# Patient Record
Sex: FEMALE | Race: BLACK OR AFRICAN AMERICAN | NOT HISPANIC OR LATINO | Employment: FULL TIME | URBAN - METROPOLITAN AREA
[De-identification: names, ages, dates, MRNs, and addresses within clinical notes are randomized per-mention and may not be internally consistent; named-entity substitution may affect disease eponyms.]

---

## 2019-04-22 ENCOUNTER — CONVERSION ENCOUNTER (OUTPATIENT)
Dept: FAMILY MEDICINE CLINIC | Facility: CLINIC | Age: 54
End: 2019-04-22

## 2019-04-22 ENCOUNTER — OFFICE VISIT CONVERTED (OUTPATIENT)
Dept: FAMILY MEDICINE CLINIC | Facility: CLINIC | Age: 54
End: 2019-04-22
Attending: FAMILY MEDICINE

## 2019-05-22 ENCOUNTER — HOSPITAL ENCOUNTER (OUTPATIENT)
Dept: FAMILY MEDICINE CLINIC | Facility: CLINIC | Age: 54
Discharge: HOME | End: 2019-05-22
Attending: FAMILY MEDICINE

## 2019-05-22 LAB
25(OH)D3 SERPL-MCNC: 30.5 NG/ML (ref 30–100)
CHOLEST SERPL-MCNC: 178 MG/DL (ref 107–200)
CHOLEST/HDLC SERPL: 4.2 {RATIO} (ref 3–6)
HDLC SERPL-MCNC: 42 MG/DL (ref 40–60)
LDLC SERPL CALC-MCNC: 123 MG/DL (ref 70–100)
T4 FREE SERPL-MCNC: 1.8 NG/DL (ref 0.9–1.8)
TRIGL SERPL-MCNC: 64 MG/DL (ref 40–150)
TSH SERPL-ACNC: 1.82 M[IU]/L (ref 0.27–4.2)
VLDLC SERPL-MCNC: 13 MG/DL (ref 5–37)

## 2019-10-22 ENCOUNTER — OFFICE VISIT CONVERTED (OUTPATIENT)
Dept: FAMILY MEDICINE CLINIC | Facility: CLINIC | Age: 54
End: 2019-10-22
Attending: FAMILY MEDICINE

## 2019-10-22 ENCOUNTER — HOSPITAL ENCOUNTER (OUTPATIENT)
Dept: FAMILY MEDICINE CLINIC | Facility: CLINIC | Age: 54
Discharge: HOME | End: 2019-10-22
Attending: FAMILY MEDICINE

## 2019-10-22 ENCOUNTER — CONVERSION ENCOUNTER (OUTPATIENT)
Dept: FAMILY MEDICINE CLINIC | Facility: CLINIC | Age: 54
End: 2019-10-22

## 2019-10-22 LAB
ALBUMIN SERPL-MCNC: 4.5 G/DL (ref 3.5–5)
ALBUMIN/GLOB SERPL: 1.1 {RATIO} (ref 1.4–2.6)
ALP SERPL-CCNC: 108 U/L (ref 53–141)
ALT SERPL-CCNC: 17 U/L (ref 10–40)
ANION GAP SERPL CALC-SCNC: 18 MMOL/L (ref 8–19)
AST SERPL-CCNC: 18 U/L (ref 15–50)
BILIRUB SERPL-MCNC: 0.75 MG/DL (ref 0.2–1.3)
BUN SERPL-MCNC: 11 MG/DL (ref 5–25)
BUN/CREAT SERPL: 15 {RATIO} (ref 6–20)
CALCIUM SERPL-MCNC: 10.1 MG/DL (ref 8.7–10.4)
CHLORIDE SERPL-SCNC: 99 MMOL/L (ref 99–111)
CHOLEST SERPL-MCNC: 196 MG/DL (ref 107–200)
CHOLEST/HDLC SERPL: 4.2 {RATIO} (ref 3–6)
CONV CO2: 25 MMOL/L (ref 22–32)
CONV TOTAL PROTEIN: 8.5 G/DL (ref 6.3–8.2)
CREAT UR-MCNC: 0.72 MG/DL (ref 0.5–0.9)
GFR SERPLBLD BASED ON 1.73 SQ M-ARVRAT: >60 ML/MIN/{1.73_M2}
GLOBULIN UR ELPH-MCNC: 4 G/DL (ref 2–3.5)
GLUCOSE SERPL-MCNC: 89 MG/DL (ref 65–99)
HDLC SERPL-MCNC: 47 MG/DL (ref 40–60)
LDLC SERPL CALC-MCNC: 135 MG/DL (ref 70–100)
OSMOLALITY SERPL CALC.SUM OF ELEC: 285 MOSM/KG (ref 273–304)
POTASSIUM SERPL-SCNC: 4.3 MMOL/L (ref 3.5–5.3)
SODIUM SERPL-SCNC: 138 MMOL/L (ref 135–147)
T4 FREE SERPL-MCNC: 1.2 NG/DL (ref 0.9–1.8)
TRIGL SERPL-MCNC: 72 MG/DL (ref 40–150)
TSH SERPL-ACNC: 19.9 M[IU]/L (ref 0.27–4.2)
VLDLC SERPL-MCNC: 14 MG/DL (ref 5–37)

## 2019-10-23 LAB — 25(OH)D3 SERPL-MCNC: 22.9 NG/ML (ref 30–100)

## 2019-10-25 ENCOUNTER — HOSPITAL ENCOUNTER (OUTPATIENT)
Dept: FAMILY MEDICINE CLINIC | Facility: CLINIC | Age: 54
Discharge: HOME | End: 2019-10-25
Attending: NURSE PRACTITIONER

## 2019-10-25 ENCOUNTER — CONVERSION ENCOUNTER (OUTPATIENT)
Dept: FAMILY MEDICINE CLINIC | Facility: CLINIC | Age: 54
End: 2019-10-25

## 2019-10-25 ENCOUNTER — OFFICE VISIT CONVERTED (OUTPATIENT)
Dept: FAMILY MEDICINE CLINIC | Facility: CLINIC | Age: 54
End: 2019-10-25
Attending: NURSE PRACTITIONER

## 2019-10-29 LAB
CONV LAST MENSTURAL PERIOD: NORMAL
SPECIMEN SOURCE: NORMAL
SPECIMEN SOURCE: NORMAL
THIN PREP CVX: NORMAL

## 2019-11-25 ENCOUNTER — HOSPITAL ENCOUNTER (OUTPATIENT)
Dept: FAMILY MEDICINE CLINIC | Facility: CLINIC | Age: 54
Discharge: HOME | End: 2019-11-25
Attending: FAMILY MEDICINE

## 2019-11-25 LAB
T4 FREE SERPL-MCNC: 1.6 NG/DL (ref 0.9–1.8)
TSH SERPL-ACNC: 5.18 M[IU]/L (ref 0.27–4.2)

## 2020-01-02 ENCOUNTER — HOSPITAL ENCOUNTER (OUTPATIENT)
Dept: FAMILY MEDICINE CLINIC | Facility: CLINIC | Age: 55
Discharge: HOME | End: 2020-01-02
Attending: FAMILY MEDICINE

## 2020-01-02 ENCOUNTER — OFFICE VISIT CONVERTED (OUTPATIENT)
Dept: FAMILY MEDICINE CLINIC | Facility: CLINIC | Age: 55
End: 2020-01-02
Attending: FAMILY MEDICINE

## 2020-01-02 LAB
FSH SERPL-ACNC: 42.4 M[IU]/ML
LH SERPL-ACNC: 28.8 M[IU]/ML

## 2020-01-29 ENCOUNTER — HOSPITAL ENCOUNTER (OUTPATIENT)
Dept: MAMMOGRAPHY | Facility: HOSPITAL | Age: 55
Discharge: HOME | End: 2020-01-29
Attending: NURSE PRACTITIONER

## 2020-10-05 ENCOUNTER — OFFICE VISIT CONVERTED (OUTPATIENT)
Dept: FAMILY MEDICINE CLINIC | Facility: CLINIC | Age: 55
End: 2020-10-05
Attending: FAMILY MEDICINE

## 2020-10-05 ENCOUNTER — CONVERSION ENCOUNTER (OUTPATIENT)
Dept: FAMILY MEDICINE CLINIC | Facility: CLINIC | Age: 55
End: 2020-10-05

## 2020-10-05 ENCOUNTER — HOSPITAL ENCOUNTER (OUTPATIENT)
Dept: LAB | Facility: HOSPITAL | Age: 55
Discharge: HOME | End: 2020-10-05
Attending: FAMILY MEDICINE

## 2020-10-05 LAB
25(OH)D3 SERPL-MCNC: 30.1 NG/ML (ref 30–100)
ALBUMIN SERPL-MCNC: 4.2 G/DL (ref 3.5–5)
ALBUMIN/GLOB SERPL: 1.2 {RATIO} (ref 1.4–2.6)
ALP SERPL-CCNC: 108 U/L (ref 53–141)
ALT SERPL-CCNC: 15 U/L (ref 10–40)
ANION GAP SERPL CALC-SCNC: 16 MMOL/L (ref 8–19)
AST SERPL-CCNC: 14 U/L (ref 15–50)
BILIRUB SERPL-MCNC: 0.27 MG/DL (ref 0.2–1.3)
BUN SERPL-MCNC: 12 MG/DL (ref 5–25)
BUN/CREAT SERPL: 15 {RATIO} (ref 6–20)
CALCIUM SERPL-MCNC: 9.4 MG/DL (ref 8.7–10.4)
CHLORIDE SERPL-SCNC: 98 MMOL/L (ref 99–111)
CHOLEST SERPL-MCNC: 180 MG/DL (ref 107–200)
CHOLEST/HDLC SERPL: 3.3 {RATIO} (ref 3–6)
CONV CO2: 27 MMOL/L (ref 22–32)
CONV TOTAL PROTEIN: 7.8 G/DL (ref 6.3–8.2)
CREAT UR-MCNC: 0.8 MG/DL (ref 0.5–0.9)
GFR SERPLBLD BASED ON 1.73 SQ M-ARVRAT: >60 ML/MIN/{1.73_M2}
GLOBULIN UR ELPH-MCNC: 3.6 G/DL (ref 2–3.5)
GLUCOSE SERPL-MCNC: 285 MG/DL (ref 65–99)
HDLC SERPL-MCNC: 54 MG/DL (ref 40–60)
LDLC SERPL CALC-MCNC: 101 MG/DL (ref 70–100)
OSMOLALITY SERPL CALC.SUM OF ELEC: 294 MOSM/KG (ref 273–304)
POTASSIUM SERPL-SCNC: 3.9 MMOL/L (ref 3.5–5.3)
SODIUM SERPL-SCNC: 137 MMOL/L (ref 135–147)
T4 FREE SERPL-MCNC: 1.6 NG/DL (ref 0.9–1.8)
TRIGL SERPL-MCNC: 126 MG/DL (ref 40–150)
TSH SERPL-ACNC: 4.56 M[IU]/L (ref 0.27–4.2)
VLDLC SERPL-MCNC: 25 MG/DL (ref 5–37)

## 2020-10-08 ENCOUNTER — HOSPITAL ENCOUNTER (OUTPATIENT)
Dept: LAB | Facility: HOSPITAL | Age: 55
Discharge: HOME | End: 2020-10-08
Attending: FAMILY MEDICINE

## 2020-10-08 ENCOUNTER — OFFICE VISIT CONVERTED (OUTPATIENT)
Dept: FAMILY MEDICINE CLINIC | Facility: CLINIC | Age: 55
End: 2020-10-08
Attending: FAMILY MEDICINE

## 2020-10-08 LAB
EST. AVERAGE GLUCOSE BLD GHB EST-MCNC: 180 MG/DL
HBA1C MFR BLD: 7.9 % (ref 3.5–5.7)

## 2020-10-19 ENCOUNTER — HOSPITAL ENCOUNTER (OUTPATIENT)
Dept: DIABETES SERVICES | Facility: HOSPITAL | Age: 55
Setting detail: RECURRING SERIES
Discharge: STILL ADMITTED OR ADMINISTRATIVE | End: 2020-12-31
Attending: FAMILY MEDICINE

## 2020-11-11 ENCOUNTER — CONVERSION ENCOUNTER (OUTPATIENT)
Dept: FAMILY MEDICINE CLINIC | Facility: CLINIC | Age: 55
End: 2020-11-11

## 2020-11-11 ENCOUNTER — HOSPITAL ENCOUNTER (OUTPATIENT)
Dept: FAMILY MEDICINE CLINIC | Facility: CLINIC | Age: 55
Discharge: HOME | End: 2020-11-11
Attending: NURSE PRACTITIONER

## 2020-11-11 ENCOUNTER — OFFICE VISIT CONVERTED (OUTPATIENT)
Dept: FAMILY MEDICINE CLINIC | Facility: CLINIC | Age: 55
End: 2020-11-11
Attending: NURSE PRACTITIONER

## 2020-11-16 ENCOUNTER — CONVERSION ENCOUNTER (OUTPATIENT)
Dept: FAMILY MEDICINE CLINIC | Facility: CLINIC | Age: 55
End: 2020-11-16

## 2020-11-16 ENCOUNTER — OFFICE VISIT CONVERTED (OUTPATIENT)
Dept: FAMILY MEDICINE CLINIC | Facility: CLINIC | Age: 55
End: 2020-11-16
Attending: FAMILY MEDICINE

## 2021-03-12 ENCOUNTER — HOSPITAL ENCOUNTER (OUTPATIENT)
Dept: MAMMOGRAPHY | Facility: HOSPITAL | Age: 56
Discharge: HOME | End: 2021-03-12
Attending: NURSE PRACTITIONER

## 2021-05-13 NOTE — PROGRESS NOTES
Progress Note      Patient Name: Lynn Stoner   Patient ID: [id number]   Sex: Female   YOB: 1965    Primary Care Provider: Jerry Coto DO    Visit Date: November 16, 2020    Provider: Jerry Coto DO   Location: [facility]   Location Address: [address] [place] [address]  [id number]   Location Phone: [phone]          Chief Complaint  · 1 month follow up - DM2, HLD, HTN  · Discuss medicaiton       History Of Present Illness  Lynn Stoner is a 55 year old /Black female who presents for evaluation and treatment of: DM. She states that she has been checking her bs frequently. Her bs have been very good. SHe has seen the dietician. She started the Metformin, but it caused too much GI distress.       Past Medical History  Disease Name Date Onset Notes   Acquired hypothyroidism --  --    Diabetes mellitus, type 2 10/08/2020 Will add Metformin and get a meter   Essential hypertension 01/02/2020 --    Pap smear for cervical cancer screening 10/25/19 --    Post-menopause 10/22/2019 --    Screening Mammogram 01/29/20 --    Vitamin D deficiency 04/22/2019 --          Past Surgical History  Procedure Name Date Notes   Colonoscopy 12/29/17 Repeat in 5 yrs   Tubal ligation --  25 yrs ago   Uterine ablation --  --          Medication List  Name Date Started Instructions   atorvastatin 10 mg oral tablet 10/08/2020 take 1 tablet (10 mg) by oral route once daily at bedtime for 90 days   Blood Glucose Test miscellaneous strip 10/08/2020 check blood sugar once daily and record (Dx: E11.9)   blood-glucose meter miscellaneous kit 10/08/2020 check blood sugar once daily and record (DX: E11.9)   Flagyl 500 mg oral tablet 11/13/2020 take 1 tablet (500 mg) by oral route 2 times per day for 7 days   Lancets,Ultra Thin miscellaneous misc 10/08/2020 check blood sugar once daily and record (DX: E11.9)   Synthroid 125 mcg oral tablet 10/05/2020 take  "1 tablet by oral route QD, except 1/2 tablet on Sunday   Vitamin D2 1,250 mcg (50,000 unit) oral capsule 10/05/2020 take 1 capsule (50,000 unit) by oral route twice weekly for 90 days         Allergy List  Allergen Name Date Reaction Notes   NO KNOWN DRUG ALLERGIES --  --  --          Family Medical History  Disease Name Relative/Age Notes   - No Family History of Colorectal Cancer  --          Social History  Finding Status Start/Stop Quantity Notes   Alcohol Never --/-- --  --    Tobacco Never --/-- --  --          Review of Systems  · Constitutional  o Denies  o : fatigue  · Eyes  o Denies  o : blurred vision, changes in vision  · HENT  o Denies  o : headaches  · Cardiovascular  o Denies  o : chest pain, irregular heart beats, rapid heart rate  · Respiratory  o Denies  o : shortness of breath, wheezing, cough, dyspnea on exertion  · Gastrointestinal  o Denies  o : nausea, vomiting, diarrhea, constipation, abdominal pain  · Endocrine  o Denies  o : polyuria, polydipsia, central obesity      Vitals  Date Time BP Position Site L\R Cuff Size HR RR TEMP (F) WT  HT  BMI kg/m2 BSA m2 O2 Sat FR L/min FiO2 HC       10/08/2020 08:36 /82 Sitting    82 - R  97.1 187lbs 0oz 5'  4\" 32.1 1.96 100 %  21%    11/11/2020 02:43 /93 Sitting    83 - R  98.2 179lbs 2oz 5'  4\" 30.75 1.92 100 %  21%    11/11/2020 03:29 /104 Sitting                 11/16/2020 12:12 /77 Sitting    89 - R  98.2 174lbs 16oz 5'  4\" 30.04 1.89 99 %  21%    11/16/2020 12:48 /94 Sitting                       Physical Examination  · Constitutional  o Appearance  o : well-nourished, well developed, no obvious deformities present  · Head and Face  o Head  o :   § Inspection  § : atraumatic, normocephalic  o Face  o :   § Inspection  § : no facial lesions  · Respiratory  o Respiratory Effort  o : breathing unlabored, no accessory muscle use  o Auscultation of Lungs  o : normal breath sounds  · Cardiovascular  o Heart  o : "   § Auscultation of Heart  § : regular rate, normal rhythm, no murmurs present          Assessment  · Diabetes mellitus, type 2     250.00/E11.9  Her bs are looking good w/o Metformin  · Essential hypertension     401.9/I10  Her BP is elevated. Will re-check. SHe felt the Irbesarten made dizzy and thus stopped it. ill try alternative       Plan  · Orders  o ACO-39: Current medications updated and reviewed (1159F, ) - - 11/16/2020  · Medications  o amlodipine 5 mg oral tablet   SIG: take 1 tablet (5 mg) by oral route once daily for 90 days   DISP: (90) Tablet with 0 refills  Prescribed on 11/16/2020     o Medications have been Reconciled  o Transition of Care or Provider Policy  · Instructions  o Continue blood sugar monitoring daily and record. Bring your log to office visits. Call the office for readings below 70 and above 250 or any complications.  o Daily foot care. Avoid walking barefoot. Annual Dilated Eye Exam.  o Discussed with patient blood pressure monitoring, hemoglobin A1C levels need to be below 7.0, and LDL (Lipid) goals below 70.  o Patient advised to monitor blood pressure (B/P) at home and journal readings. Patient informed that a B/P reading at home of more than 130/80 is considered hypertension. For readings greater euok702/90 or higher patient is advised to follow up in the office with readings for management. Patient advised to limit sodium intake.  o Patient is taking medications as prescribed and doing well.   o Take all medications as prescribed/directed.  o Patient instructed/educated on their diet and exercise program.  o Patient was educated/instructed on their diagnosis, treatment and medications prior to discharge from the clinic today.  o Patient instructed to seek medical attention urgently for new or worsening symptoms.  o Call the office with any concerns or questions.  o Bring all medicines with their bottles to each office visit.  · Disposition  o Call or Return if symptoms  worsen or persist.  o Return Visit Request in/on 2 months +/- 2 days ([id number]).            Electronically Signed by: Jerry Coto DO -Author on November 16, 2020 12:52:47 PM

## 2021-05-13 NOTE — PROGRESS NOTES
Progress Note      Patient Name: Lynn Stoner   Patient ID: [id number]   Sex: Female   YOB: 1965    Primary Care Provider: Jerry Coto DO    Visit Date: November 11, 2020    Provider: AIDEN Anderson   Location: [facility]   Location Address: [address] [place] [address]  [id number]   Location Phone: [phone]          Chief Complaint  · Annual Exam  · PAP exam  · (Health Maintainence Information Reviewed Under Results)      History Of Present Illness  Last PAP Smear: 10/25/19   Date of Last Mammogram: 01/29/20   Date of Last Colonoscopy: 12/29/17   No current complaints.      Pt is here for a Well Women/Pap Smear    Postmenopausal since 10/2019.    newly dx Dm - pt report diet and exercise modifications.     elevated b/p - pt reports compliance with Irbesartan and d/c hctz d/t nausea.                  Past Medical History  Disease Name Date Onset Notes   Acquired hypothyroidism --  --    Diabetes mellitus, type 2 10/08/2020 Will add Metformin and get a meter   Essential hypertension 01/02/2020 --    Pap smear for cervical cancer screening 10/25/19 --    Post-menopause 10/22/2019 --    Screening Mammogram 01/29/20 --    Vitamin D deficiency 04/22/2019 --          Past Surgical History  Procedure Name Date Notes   Colonoscopy 12/29/17 Repeat in 5 yrs   Tubal ligation --  25 yrs ago   Uterine ablation --  --          Medication List  Name Date Started Instructions   atorvastatin 10 mg oral tablet 10/08/2020 take 1 tablet (10 mg) by oral route once daily at bedtime for 90 days   Blood Glucose Test miscellaneous strip 10/08/2020 check blood sugar once daily and record (Dx: E11.9)   blood-glucose meter miscellaneous kit 10/08/2020 check blood sugar once daily and record (DX: E11.9)   irbesartan 300 mg oral tablet 11/11/2020 take 1 tablet (300 mg) by oral route once daily for 30 days   Lancets,Ultra Thin miscellaneous misc 10/08/2020 check blood  sugar once daily and record (DX: E11.9)   metformin 500 mg oral tablet 10/08/2020 take 1 tablet (500 mg) by oral route 2 times per day with morning and evening meals for 90 days   Synthroid 125 mcg oral tablet 10/05/2020 take 1 tablet by oral route QD, except 1/2 tablet on Sunday   Vitamin D2 1,250 mcg (50,000 unit) oral capsule 10/05/2020 take 1 capsule (50,000 unit) by oral route twice weekly for 90 days         Allergy List  Allergen Name Date Reaction Notes   NO KNOWN DRUG ALLERGIES --  --  --          Family Medical History  Disease Name Relative/Age Notes   - No Family History of Colorectal Cancer  --          Social History  Finding Status Start/Stop Quantity Notes   Alcohol Never --/-- --  --    Tobacco Never --/-- --  --          Review of Systems  · Constitutional  o Denies  o : appetite change, fatigue, night sweats, weight gain, weight loss  · HENT  o Denies  o : hearing loss, tinnitus, vertigo, nasal discharge, nose bleeds, dental pain, mouth lesions, sore throat  · Breasts  o Denies  o : Lumps, tenderness, swelling, Nipple Discharge  · Cardiovascular  o Denies  o : chest Pain, decreased exercise tolerance, exertional dyspnea, palpitations  · Respiratory  o Denies  o : frequent cough, shortness of breath, wheezing, snoring, apneas  · Gastrointestinal  o Denies  o : abdominal pain, nausea, vomiting, dysphagia, reflux/heartburn, changes in bowel habits, constipation, diarrhea  · Genitourinary  o Denies  o : dysuria, hematuria, incontinence, nocturia, urinary frequency, urinary urgency, sexual dysfunction  · Neurologic  o Denies  o : abnormal gait, facial weakness, headache, memory problems, tingling or numbness, seizures, tremor  · Psychiatric  o Denies  o : anxiety, decreased concentration, irritability, panic attacks, sleep distrubances, sadness/tearfulness      Vitals  Date Time BP Position Site L\R Cuff Size HR RR TEMP (F) WT  HT  BMI kg/m2 BSA m2 O2 Sat FR L/min FiO2 HC       10/05/2020 02:37 PM  "149/70 Sitting    94 - R  97.4 188lbs 0oz 5'  4\" 32.27 1.96 98 %      10/08/2020 08:36 /82 Sitting    82 - R  97.1 187lbs 0oz 5'  4\" 32.1 1.96 100 %  21%    11/11/2020 02:43 /93 Sitting    83 - R  98.2 179lbs 2oz 5'  4\" 30.75 1.92 100 %  21%    11/11/2020 03:29 /104 Sitting                       Physical Examination  · Constitutional  o Appearance  o : well-nourished, in no acute distress  · Neck  o Inspection/Palpation  o : normal appearance, no masses or tenderness, trachea midline  o Range of Motion  o : cervical range of motion within normal limits  o Thyroid  o : gland size normal, nontender, no nodules or masses present on palpation  · Respiratory  o Respiratory Effort  o : breathing unlabored  o Inspection of Chest  o : normal appearance  o Auscultation of Lungs  o : normal breath sounds throughout  · Cardiovascular  o Heart  o :   § Auscultation of Heart  § : regular rate and rhythm, no murmurs, gallops or rubs  o Peripheral Vascular System  o :   § Carotid Arteries  § : normal pulses bilaterally, no bruits present  § Pedal Pulses  § : pulses 2 bilaterally  § Extremities  § : no edema  · Breasts  o Inspection of Breasts  o : breasts symmetrical, no skin changes, no deformities present, no discharge present  o Palpation of Breasts, Axillae  o : no masses present on palpation, no breast tenderness  · Gastrointestinal  o Abdominal Examination  o : abdomen nontender to palpation, tone normal without rigidity or guarding, no masses present, normal bowel sounds  · Genitourinary  o External Genitalia  o : no inflammation, no lesions present  o Vagina  o : normal vaginal vault, no discharge present, no inflammatory lesions present, no masses present  o Urethra  o :   § Urethral Meatus  § : no inflammation present  o Bladder  o : nontender to palpation  o Cervix  o : appearance healthy, no lesions present, nontender to palpation, no discharges, no bleeding present, normal midline " position  o Uterus  o : nontender to palpation, no masses present, position midline/midplane  o Adnexa  o : no tenderness or masses present on bimanual examination  o Anus  o : no inflammation or lesions present  o Perineum  o : perineum within normal limits  · Lymphatic  o Neck  o : no lymphadenopathy present  o Axilla  o : no lymphadenopathy present  o Groin  o : no lymphadenopathy present  · Neurologic  o Mental Status Examination  o :   § Orientation  § : grossly oriented to person, place and time  o Gait and Station  o : normal gait, able to stand without difficulty  · Psychiatric  o Judgement and Insight  o : judgment and insight intact  o Mood and Affect  o : mood normal, affect appropriate          Assessment  · Pap Smear     V76.2/Z01.419  · Routine gynecological examination     V72.31/Z01.419  · Screening Mammogram     V76.10/Z12.39      Plan  · Orders  o Pap smear (07541) - V76.2/Z01.419 - 11/11/2020  o Vaginal Screen (Candida, Gardnerella & Trichomonas) (65852) - V72.31/Z01.419 - 11/11/2020  o Screening Mammogram 3D Bilateral (63907, , 38135) - V76.10/Z12.39 - 02/01/2021  · Medications  o irbesartan 300 mg oral tablet   SIG: take 1 tablet (300 mg) by oral route once daily for 30 days   DISP: (30) Tablet with 1 refills  Adjusted on 11/11/2020     o hydrochlorothiazide 12.5 mg oral tablet   SIG: take 1 tablet (12.5 mg) by oral route once daily for 90 days   DISP: (90) Tablet with 0 refills  Discontinued on 11/11/2020     · Instructions  o **Pap Test/Liquid Based:   o Thin Prep  o Source:   o Cervix  o ********  o **Perform Reflex Human Papilloma Virus (HPV) High Risk on this Pap (If atypical squamous cells of the undetermined signifigcance (ASCUS)/Atypical Glandular Cells of undetermined significance (AGCUS): Low Grade Squamous Intraepitheal lesion (LGSIL): **  o ********  o Medicare:  o No  o **Is this an annual PAP:  o Yes  o Counseled on monthly breast self exams.   o Counseled on diet and  exercise.   o Counseled on weight-bearing exercise.  o Recommended Calcium with Vitamin D twice daily.  o Used cytobrush to obtain Pap smear specimen. Sent to pathology for testing and review.  o handouts: dm carb counting, heart healthy diet.   o Electronically Identified Patient Education Materials Provided Electronically  · Disposition  o Follow up pending results.            Electronically Signed by: AIDEN Anderson -Author on November 11, 2020 03:30:11 PM

## 2021-05-13 NOTE — PROGRESS NOTES
Progress Note      Patient Name: Lynn Stoner   Patient ID: [id number]   Sex: Female   YOB: 1965    Primary Care Provider: Jerry Coto DO    Visit Date: October 5, 2020    Provider: Jerry Coto DO   Location: [facility]   Location Address: [address] [place] [address]  [id number]   Location Phone: [phone]          Chief Complaint  · follow up- hot flashes/ HTN      History Of Present Illness  Lynn Stoner is a 55 year old /Black female who presents for evaluation and treatment of: Hypothyroid, Vit D def., and HTN. She is taken her medication daily as prescribed. She checks her BP and it has been good.       Past Medical History  Disease Name Date Onset Notes   Acquired hypothyroidism --  --    Essential hypertension 01/02/2020 --    Pap smear for cervical cancer screening 4/2018 --    Post-menopause 10/22/2019 --    Screening Mammogram 1/2019 --    Vitamin D deficiency 04/22/2019 --          Past Surgical History  Procedure Name Date Notes   Colonoscopy 2018 --    Tubal ligation --  25 yrs ago   Uterine ablation --  --          Medication List  Name Date Started Instructions   Synthroid 125 mcg oral tablet 10/05/2020 take 1 tablet by oral route QD, except 1/2 tablet on Sunday   Vitamin D2 1,250 mcg (50,000 unit) oral capsule 10/05/2020 take 1 capsule (50,000 unit) by oral route twice weekly for 90 days         Allergy List  Allergen Name Date Reaction Notes   NO KNOWN DRUG ALLERGIES --  --  --          Family Medical History  Disease Name Relative/Age Notes   - No Family History of Colorectal Cancer  --          Social History  Finding Status Start/Stop Quantity Notes   Alcohol Never --/-- --  --    Tobacco Never --/-- --  --          Review of Systems  · Constitutional  o Denies  o : fatigue  · Eyes  o Denies  o : changes in vision  · HENT  o Denies  o : headaches  · Cardiovascular  o Admits  o : rapid heart  "rate  o Denies  o : chest pain, irregular heart beats  · Respiratory  o Denies  o : shortness of breath, wheezing, cough, dyspnea on exertion  · Gastrointestinal  o Denies  o : diarrhea, constipation      Vitals  Date Time BP Position Site L\R Cuff Size HR RR TEMP (F) WT  HT  BMI kg/m2 BSA m2 O2 Sat FR L/min FiO2 HC       10/25/2019 11:40 /85 Sitting    68 - R 18 97.1 188lbs 8oz 5'  4\" 32.36 1.96 100 %      01/02/2020 01:58 /88 Sitting    82 - R   187lbs 0oz 5'  4\" 32.1 1.96 98 %  21%    10/05/2020 02:37 /70 Sitting    94 - R  97.4 188lbs 0oz 5'  4\" 32.27 1.96 98 %      10/05/2020 03:20 /70 Sitting                       Physical Examination  · Constitutional  o Appearance  o : well-nourished, well developed, alert, in no acute distress, well-tended appearance  · Head and Face  o Head  o :   § Inspection  § : atraumatic, normocephalic  o Face  o :   § Inspection  § : no facial lesions  o HEENT  o : Unremarkable  · Eyes  o Conjunctivae  o : conjunctivae normal  o Sclerae  o : sclerae white  o Pupils and Irises  o : pupils equal and round, pupils reactive to light bilaterally  o Eyelids/Ocular Adnexae  o : eyelid appearance normal  · Ears, Nose, Mouth and Throat  o Ears  o :   § External Ears  § : appearance within normal limits, no lesions present  § Otoscopic Examination  § : tympanic membrane appearance within normal limits bilaterally without perforations, mobility normal  o Nose  o :   § External Nose  § : appearance normal  o Oral Cavity  o :   § Oral Mucosa  § : oral mucosa normal  § Lips  § : lip appearance normal  § Teeth  § : normal dentition for age  § Gums  § : gums pink, non-swollen, no bleeding present  § Tongue  § : tongue appearance normal  § Palate  § : hard palate normal, soft palate appearance normal  o Throat  o :   § Oropharynx  § : no inflammation or lesions present, tonsils within normal limits  · Neck  o Inspection/Palpation  o : normal appearance, no masses or " tenderness, trachea midline  o Thyroid  o : gland size normal, nontender, no nodules or masses present on palpation  · Respiratory  o Respiratory Effort  o : breathing unlabored  o Auscultation of Lungs  o : normal breath sounds  · Cardiovascular  o Heart  o :   § Auscultation of Heart  § : regular rate, normal rhythm, no murmurs present  o Peripheral Vascular System  o :   § Extremities  § : no edema  · Lymphatic  o Neck  o : no lymphadenopathy           Assessment  · Screening for depression     V79.0/Z13.89  · Essential hypertension     401.9/I10  Her BP needs to be lower. Will add HCTZ  · Vitamin D deficiency     268.9/E55.9  will update   · Acquired hypothyroidism     244.9/E03.9  will update       Plan  · Orders  o Annual depression screening, 15 minutes (26673, ) - V79.0/Z13.89 - 10/05/2020  o CMP [facility] (44750) - 401.9/I10 - 10/05/2020  o Lipid Panel [facility] (95518) - 401.9/I10 - 10/05/2020  o Thyroid Profile (33539, 10139, THYII) - 244.9/E03.9 - 10/05/2020  o Vitamin D (25-Hydroxy) Level (92236) - 268.9/E55.9 - 10/05/2020  o ACO-39: Current medications updated and reviewed (1159F, ) - - 10/05/2020  o ACO-18: Negative screen for clinical depression using a standardized tool () - - 10/05/2020  o ACO-14: Influenza immunization was not administered for reasons documented [facility] () - - 10/05/2020   PT DECLINED  · Medications  o hydrochlorothiazide 12.5 mg oral tablet   SIG: take 1 tablet (12.5 mg) by oral route once daily for 90 days   DISP: (90) Tablet with 0 refills  Prescribed on 10/05/2020     o Synthroid 125 mcg oral tablet   SIG: take 1 tablet by oral route QD, except 1/2 tablet on Sunday   DISP: (90) Tablet with 3 refills  Adjusted on 10/05/2020     o Vitamin D2 1,250 mcg (50,000 unit) oral capsule   SIG: take 1 capsule (50,000 unit) by oral route twice weekly for 90 days   DISP: (26) Capsule with 3 refills  Adjusted on 10/05/2020     o Medications have been Reconciled  o Transition of Care or  Provider Policy  · Instructions  o Depression Screen completed and scanned into the EMR under the designated folder within the patient's documents.  o The provider screening met the required time of 15 minutes.  o Patient is taking medications as prescribed and doing well.   o Take all medications as prescribed/directed.  o Patient instructed/educated on their diet and exercise program.  o Patient was educated/instructed on their diagnosis, treatment and medications prior to discharge from the clinic today.  o Patient instructed to seek medical attention urgently for new or worsening symptoms.  o Call the office with any concerns or questions.  o Bring all medicines with their bottles to each office visit.  · Disposition  o Call or Return if symptoms worsen or persist.  o Return Visit Request in/on 2 months +/- 2 days ([id number]).            Electronically Signed by: Delisa Palacio MA -Author on October 5, 2020 03:42:58 PM  Electronically Co-signed by: Jerry Coto DO -Reviewer on October 5, 2020 04:47:31 PM

## 2021-05-13 NOTE — PROGRESS NOTES
Progress Note      Patient Name: Lynn Stoner   Patient ID: [id number]   Sex: Female   YOB: 1965    Primary Care Provider: Jerry Coto DO    Visit Date: October 8, 2020    Provider: Jerry Coto DO   Location: [facility]   Location Address: [address] [place] [address]  [id number]   Location Phone: [phone]          Chief Complaint  · follow up- Hgb A1c 10/5/20      History Of Present Illness  Lynn Stoner is a 55 year old /Black female who presents for evaluation and treatment of: elevated bs. She does exercise, but more cross fit.       Past Medical History  Disease Name Date Onset Notes   Acquired hypothyroidism --  --    Essential hypertension 01/02/2020 --    Pap smear for cervical cancer screening 10/25/19 --    Post-menopause 10/22/2019 --    Screening Mammogram 01/29/20 --    Vitamin D deficiency 04/22/2019 --          Past Surgical History  Procedure Name Date Notes   Colonoscopy 12/29/17 --    Tubal ligation --  25 yrs ago   Uterine ablation --  --          Medication List  Name Date Started Instructions   hydrochlorothiazide 12.5 mg oral tablet 10/05/2020 take 1 tablet (12.5 mg) by oral route once daily for 90 days   Synthroid 125 mcg oral tablet 10/05/2020 take 1 tablet by oral route QD, except 1/2 tablet on Sunday   Vitamin D2 1,250 mcg (50,000 unit) oral capsule 10/05/2020 take 1 capsule (50,000 unit) by oral route twice weekly for 90 days         Allergy List  Allergen Name Date Reaction Notes   NO KNOWN DRUG ALLERGIES --  --  --        Allergies Reconciled  Family Medical History  Disease Name Relative/Age Notes   - No Family History of Colorectal Cancer  --          Social History  Finding Status Start/Stop Quantity Notes   Alcohol Never --/-- --  --    Tobacco Never --/-- --  --          Review of Systems  · Constitutional  o Denies  o : fatigue  · Eyes  o Denies  o : blurred  "vision  · HENT  o Admits  o : headaches  · Endocrine  o Admits  o : central obesity  o Denies  o : polyuria, polydipsia      Vitals  Date Time BP Position Site L\R Cuff Size HR RR TEMP (F) WT  HT  BMI kg/m2 BSA m2 O2 Sat FR L/min FiO2 HC       10/08/2020 08:36 /82 Sitting    82 - R  97.1 187lbs 0oz 5'  4\" 32.1 1.96 100 %  21%          Physical Examination  · Constitutional  o Appearance  o : well-nourished, well developed, alert, in no acute distress, well-tended appearance  · Head and Face  o Head  o :   § Inspection  § : atraumatic, normocephalic  o Face  o :   § Inspection  § : no facial lesions  o HEENT  o : Unremarkable  · Eyes  o Conjunctivae  o : conjunctivae normal  o Sclerae  o : sclerae white  o Pupils and Irises  o : pupils equal and round, pupils reactive to light bilaterally  o Eyelids/Ocular Adnexae  o : eyelid appearance normal  · Ears, Nose, Mouth and Throat  o Ears  o :   § External Ears  § : appearance within normal limits, no lesions present  § Otoscopic Examination  § : tympanic membrane appearance within normal limits bilaterally without perforations, mobility normal  o Nose  o :   § External Nose  § : appearance normal  o Oral Cavity  o :   § Oral Mucosa  § : oral mucosa normal  § Lips  § : lip appearance normal  § Teeth  § : normal dentition for age  § Gums  § : gums pink, non-swollen, no bleeding present  § Tongue  § : tongue appearance normal  § Palate  § : hard palate normal, soft palate appearance normal  o Throat  o :   § Oropharynx  § : no inflammation or lesions present, tonsils within normal limits  · Neck  o Inspection/Palpation  o : normal appearance, no masses or tenderness, trachea midline  o Thyroid  o : gland size normal, nontender, no nodules or masses present on palpation  · Respiratory  o Respiratory Effort  o : breathing unlabored  o Auscultation of Lungs  o : normal breath sounds  · Cardiovascular  o Heart  o :   § Auscultation of Heart  § : regular rate, normal " rhythm, no murmurs present  · Lymphatic  o Neck  o : no lymphadenopathy           Assessment  · Diabetes mellitus, type 2     250.00/E11.9  Will add Metformin and get a meter  · Essential hypertension     401.9/I10  will start an ARB due to the Dx of DM  · Hyperlipidemia     272.4/E78.5  Will add statin      Plan  · Orders  o ACO-39: Current medications updated and reviewed (, 1159F) - - 10/08/2020  o ACO-14: Influenza immunization was not administered for reasons documented [facility] () - - 10/08/2020   Patient declined  o Diabetes Medical Nutrition Therapy (DMNT) [facility] (DMMNT) - - 10/08/2020  · Medications  o metformin 500 mg oral tablet   SIG: take 1 tablet (500 mg) by oral route 2 times per day with morning and evening meals for 90 days   DISP: (180) Tablet with 0 refills  Prescribed on 10/08/2020     o atorvastatin 10 mg oral tablet   SIG: take 1 tablet (10 mg) by oral route once daily at bedtime for 90 days   DISP: (90) Tablet with 0 refills  Prescribed on 10/08/2020     o irbesartan 150 mg oral tablet   SIG: take 1 tablet (150 mg) by oral route once daily for 90 days   DISP: (90) Tablet with 0 refills  Prescribed on 10/08/2020     o blood-glucose meter miscellaneous kit   SIG: check blood sugar once daily and record (DX: E11.9)   DISP: (1) Kit with 0 refills  Prescribed on 10/08/2020     o Lancets,Ultra Thin miscellaneous misc   SIG: check blood sugar once daily and record (DX: E11.9)   DISP: (1) Lancet with 3 refills  Prescribed on 10/08/2020     o Blood Glucose Test miscellaneous strip   SIG: check blood sugar once daily and record (Dx: E11.9)   DISP: (1) Strip with 0 refills  Prescribed on 10/08/2020     o Medications have been Reconciled  o Transition of Care or Provider Policy  · Instructions  o Continue blood sugar monitoring daily and record. Bring your log to office visits. Call the office for readings below 70 and above 250 or any complications.  o Daily foot care. Avoid walking  barefoot. Annual Dilated Eye Exam.  o Discussed with patient blood pressure monitoring, hemoglobin A1C levels need to be below 7.0, and LDL (Lipid) goals below 70.  o Patient advised to monitor blood pressure (B/P) at home and journal readings. Patient informed that a B/P reading at home of more than 130/80 is considered hypertension. For readings greater [id number]/90 or higher patient is advised to follow up in the office with readings for management. Patient advised to limit sodium intake.  o Patient was educated and given low cholesterol diet information.  o Recommended exercise program to assist with cholesterol, weight loss and overall health improvement.  o Advised that cheeses and other sources of dairy fats, animal fats, fast food, and the extras (candy, pastries, pies, doughnuts and cookies) all contain LDL raising nutrients. Advised to increase fruits, vegetables, whole grains, and to monitor portion sizes.   o Patient is taking medications as prescribed and doing well.   o Take all medications as prescribed/directed.  o Patient instructed/educated on their diet and exercise program.  o Patient was educated/instructed on their diagnosis, treatment and medications prior to discharge from the clinic today.  o Patient instructed to seek medical attention urgently for new or worsening symptoms.  o Call the office with any concerns or questions.  o Bring all medicines with their bottles to each office visit.  · Disposition  o Call or Return if symptoms worsen or persist.  o Return Visit Request in/on 4 weeks +/- 2 days ([id number]).            Electronically Signed by: Jerry Coto, DO -Author on October 8, 2020 09:18:50 AM

## 2021-05-14 VITALS
SYSTOLIC BLOOD PRESSURE: 164 MMHG | HEART RATE: 83 BPM | OXYGEN SATURATION: 100 % | BODY MASS INDEX: 30.58 KG/M2 | WEIGHT: 179.12 LBS | TEMPERATURE: 98.2 F | DIASTOLIC BLOOD PRESSURE: 93 MMHG | HEIGHT: 64 IN | DIASTOLIC BLOOD PRESSURE: 104 MMHG | SYSTOLIC BLOOD PRESSURE: 164 MMHG

## 2021-05-14 VITALS
HEART RATE: 82 BPM | BODY MASS INDEX: 31.92 KG/M2 | WEIGHT: 187 LBS | DIASTOLIC BLOOD PRESSURE: 82 MMHG | HEIGHT: 64 IN | SYSTOLIC BLOOD PRESSURE: 155 MMHG | TEMPERATURE: 97.1 F | OXYGEN SATURATION: 100 %

## 2021-05-14 VITALS
HEART RATE: 89 BPM | BODY MASS INDEX: 29.88 KG/M2 | DIASTOLIC BLOOD PRESSURE: 77 MMHG | SYSTOLIC BLOOD PRESSURE: 163 MMHG | TEMPERATURE: 98.2 F | HEIGHT: 64 IN | OXYGEN SATURATION: 99 % | WEIGHT: 175 LBS

## 2021-05-14 VITALS
HEART RATE: 94 BPM | DIASTOLIC BLOOD PRESSURE: 70 MMHG | WEIGHT: 188 LBS | BODY MASS INDEX: 32.1 KG/M2 | SYSTOLIC BLOOD PRESSURE: 140 MMHG | TEMPERATURE: 97.4 F | SYSTOLIC BLOOD PRESSURE: 149 MMHG | DIASTOLIC BLOOD PRESSURE: 70 MMHG | HEIGHT: 64 IN | OXYGEN SATURATION: 98 %

## 2021-05-15 VITALS
TEMPERATURE: 97.1 F | HEIGHT: 64 IN | OXYGEN SATURATION: 100 % | BODY MASS INDEX: 32.18 KG/M2 | HEART RATE: 68 BPM | DIASTOLIC BLOOD PRESSURE: 85 MMHG | WEIGHT: 188.5 LBS | SYSTOLIC BLOOD PRESSURE: 148 MMHG | RESPIRATION RATE: 18 BRPM

## 2021-05-15 VITALS
HEIGHT: 64 IN | BODY MASS INDEX: 32.84 KG/M2 | DIASTOLIC BLOOD PRESSURE: 93 MMHG | DIASTOLIC BLOOD PRESSURE: 82 MMHG | OXYGEN SATURATION: 100 % | HEART RATE: 71 BPM | SYSTOLIC BLOOD PRESSURE: 168 MMHG | TEMPERATURE: 97.9 F | SYSTOLIC BLOOD PRESSURE: 173 MMHG | WEIGHT: 192.37 LBS

## 2021-05-15 VITALS
TEMPERATURE: 97.7 F | WEIGHT: 185.5 LBS | HEART RATE: 80 BPM | HEIGHT: 64 IN | DIASTOLIC BLOOD PRESSURE: 72 MMHG | SYSTOLIC BLOOD PRESSURE: 142 MMHG | BODY MASS INDEX: 31.67 KG/M2 | OXYGEN SATURATION: 99 %

## 2021-05-15 VITALS
SYSTOLIC BLOOD PRESSURE: 151 MMHG | HEART RATE: 82 BPM | DIASTOLIC BLOOD PRESSURE: 88 MMHG | HEIGHT: 64 IN | OXYGEN SATURATION: 98 % | WEIGHT: 187 LBS | BODY MASS INDEX: 31.92 KG/M2

## 2021-11-01 ENCOUNTER — OFFICE VISIT (OUTPATIENT)
Dept: FAMILY MEDICINE CLINIC | Facility: CLINIC | Age: 56
End: 2021-11-01

## 2021-11-01 VITALS
TEMPERATURE: 97.4 F | HEART RATE: 106 BPM | SYSTOLIC BLOOD PRESSURE: 172 MMHG | HEIGHT: 64 IN | OXYGEN SATURATION: 100 % | WEIGHT: 179.8 LBS | RESPIRATION RATE: 17 BRPM | DIASTOLIC BLOOD PRESSURE: 98 MMHG | BODY MASS INDEX: 30.7 KG/M2

## 2021-11-01 DIAGNOSIS — I10 ESSENTIAL HYPERTENSION: Primary | ICD-10-CM

## 2021-11-01 DIAGNOSIS — E11.9 TYPE 2 DIABETES MELLITUS WITHOUT COMPLICATION, WITHOUT LONG-TERM CURRENT USE OF INSULIN (HCC): ICD-10-CM

## 2021-11-01 DIAGNOSIS — E03.9 ACQUIRED HYPOTHYROIDISM: ICD-10-CM

## 2021-11-01 PROBLEM — E55.9 VITAMIN D DEFICIENCY: Status: ACTIVE | Noted: 2019-04-22

## 2021-11-01 PROBLEM — Z12.4 PAP SMEAR FOR CERVICAL CANCER SCREENING: Status: ACTIVE | Noted: 2019-10-25

## 2021-11-01 PROBLEM — Z78.0 POST-MENOPAUSE: Status: ACTIVE | Noted: 2019-10-22

## 2021-11-01 LAB
CHOLEST SERPL-MCNC: 222 MG/DL (ref 0–200)
HBA1C MFR BLD: 12.09 % (ref 4.8–5.6)
HDLC SERPL-MCNC: 41 MG/DL (ref 40–60)
LDLC SERPL CALC-MCNC: 160 MG/DL (ref 0–100)
LDLC/HDLC SERPL: 3.84 {RATIO}
T4 FREE SERPL-MCNC: 1.87 NG/DL (ref 0.93–1.7)
TRIGL SERPL-MCNC: 117 MG/DL (ref 0–150)
TSH SERPL DL<=0.05 MIU/L-ACNC: 2.98 UIU/ML (ref 0.27–4.2)
VLDLC SERPL-MCNC: 21 MG/DL (ref 5–40)

## 2021-11-01 PROCEDURE — 84439 ASSAY OF FREE THYROXINE: CPT | Performed by: FAMILY MEDICINE

## 2021-11-01 PROCEDURE — 99214 OFFICE O/P EST MOD 30 MIN: CPT | Performed by: FAMILY MEDICINE

## 2021-11-01 PROCEDURE — 80053 COMPREHEN METABOLIC PANEL: CPT | Performed by: FAMILY MEDICINE

## 2021-11-01 PROCEDURE — 36415 COLL VENOUS BLD VENIPUNCTURE: CPT | Performed by: FAMILY MEDICINE

## 2021-11-01 PROCEDURE — 84443 ASSAY THYROID STIM HORMONE: CPT | Performed by: FAMILY MEDICINE

## 2021-11-01 PROCEDURE — 80061 LIPID PANEL: CPT | Performed by: FAMILY MEDICINE

## 2021-11-01 PROCEDURE — 83036 HEMOGLOBIN GLYCOSYLATED A1C: CPT | Performed by: FAMILY MEDICINE

## 2021-11-01 RX ORDER — BLOOD SUGAR DIAGNOSTIC
STRIP MISCELLANEOUS
Qty: 100 EACH | Refills: 3 | Status: SHIPPED | OUTPATIENT
Start: 2021-11-01 | End: 2022-11-01 | Stop reason: SDUPTHER

## 2021-11-01 RX ORDER — LISINOPRIL 20 MG/1
20 TABLET ORAL DAILY
Qty: 90 TABLET | Refills: 0 | Status: SHIPPED | OUTPATIENT
Start: 2021-11-01 | End: 2022-05-05 | Stop reason: SDUPTHER

## 2021-11-01 RX ORDER — DEXAMETHASONE 4 MG/1
TABLET ORAL
COMMUNITY
Start: 2021-09-28 | End: 2021-11-01

## 2021-11-01 RX ORDER — LANCETS 33 GAUGE
33 EACH MISCELLANEOUS DAILY
Qty: 100 EACH | Refills: 3 | Status: SHIPPED | OUTPATIENT
Start: 2021-11-01 | End: 2022-02-09

## 2021-11-01 NOTE — ASSESSMENT & PLAN NOTE
It has been a while since she has had her labs done we will go ahead and update her diabetic labs here today.

## 2021-11-01 NOTE — ASSESSMENT & PLAN NOTE
Her blood pressure is modestly elevated here today.  We will go ahead and start her on medication.

## 2021-11-01 NOTE — ASSESSMENT & PLAN NOTE
Her symptoms are concerning for her thyroid being off.  We will look go ahead and update her thyroid profile

## 2021-11-02 LAB
ALBUMIN SERPL-MCNC: 4.7 G/DL (ref 3.5–5.2)
ALBUMIN/GLOB SERPL: 1.2 G/DL
ALP SERPL-CCNC: 134 U/L (ref 39–117)
ALT SERPL W P-5'-P-CCNC: 20 U/L (ref 1–33)
ANION GAP SERPL CALCULATED.3IONS-SCNC: 16 MMOL/L (ref 5–15)
AST SERPL-CCNC: 13 U/L (ref 1–32)
BILIRUB SERPL-MCNC: 0.4 MG/DL (ref 0–1.2)
BUN SERPL-MCNC: 11 MG/DL (ref 6–20)
BUN/CREAT SERPL: 14.1 (ref 7–25)
CALCIUM SPEC-SCNC: 10.2 MG/DL (ref 8.6–10.5)
CHLORIDE SERPL-SCNC: 93 MMOL/L (ref 98–107)
CO2 SERPL-SCNC: 24 MMOL/L (ref 22–29)
CREAT SERPL-MCNC: 0.78 MG/DL (ref 0.57–1)
GFR SERPL CREATININE-BSD FRML MDRD: 93 ML/MIN/1.73
GLOBULIN UR ELPH-MCNC: 3.8 GM/DL
GLUCOSE SERPL-MCNC: 486 MG/DL (ref 65–99)
POTASSIUM SERPL-SCNC: 4.3 MMOL/L (ref 3.5–5.2)
PROT SERPL-MCNC: 8.5 G/DL (ref 6–8.5)
SODIUM SERPL-SCNC: 133 MMOL/L (ref 136–145)

## 2021-11-02 RX ORDER — ATORVASTATIN CALCIUM 40 MG/1
40 TABLET, FILM COATED ORAL NIGHTLY
Qty: 90 TABLET | Refills: 0 | Status: SHIPPED | OUTPATIENT
Start: 2021-11-02 | End: 2022-01-31

## 2021-11-02 RX ORDER — METFORMIN HYDROCHLORIDE 500 MG/1
500 TABLET, EXTENDED RELEASE ORAL 2 TIMES DAILY WITH MEALS
Qty: 180 TABLET | Refills: 0 | Status: SHIPPED | OUTPATIENT
Start: 2021-11-02 | End: 2022-11-03 | Stop reason: SDUPTHER

## 2021-11-15 ENCOUNTER — OFFICE VISIT (OUTPATIENT)
Dept: FAMILY MEDICINE CLINIC | Facility: CLINIC | Age: 56
End: 2021-11-15

## 2021-11-15 VITALS
WEIGHT: 178 LBS | DIASTOLIC BLOOD PRESSURE: 101 MMHG | BODY MASS INDEX: 30.39 KG/M2 | OXYGEN SATURATION: 96 % | SYSTOLIC BLOOD PRESSURE: 178 MMHG | HEART RATE: 105 BPM | HEIGHT: 64 IN | TEMPERATURE: 98.2 F

## 2021-11-15 DIAGNOSIS — Z12.31 VISIT FOR SCREENING MAMMOGRAM: ICD-10-CM

## 2021-11-15 DIAGNOSIS — I10 ESSENTIAL HYPERTENSION: ICD-10-CM

## 2021-11-15 DIAGNOSIS — L30.9 DERMATITIS: ICD-10-CM

## 2021-11-15 DIAGNOSIS — Z01.419 ROUTINE GYNECOLOGICAL EXAMINATION: Primary | ICD-10-CM

## 2021-11-15 LAB
CANDIDA SPECIES: NEGATIVE
GARDNERELLA VAGINALIS: POSITIVE
T VAGINALIS DNA VAG QL PROBE+SIG AMP: NEGATIVE

## 2021-11-15 PROCEDURE — 87510 GARDNER VAG DNA DIR PROBE: CPT | Performed by: NURSE PRACTITIONER

## 2021-11-15 PROCEDURE — 87624 HPV HI-RISK TYP POOLED RSLT: CPT | Performed by: NURSE PRACTITIONER

## 2021-11-15 PROCEDURE — 99396 PREV VISIT EST AGE 40-64: CPT | Performed by: NURSE PRACTITIONER

## 2021-11-15 PROCEDURE — G0123 SCREEN CERV/VAG THIN LAYER: HCPCS | Performed by: NURSE PRACTITIONER

## 2021-11-15 PROCEDURE — 87660 TRICHOMONAS VAGIN DIR PROBE: CPT | Performed by: NURSE PRACTITIONER

## 2021-11-15 PROCEDURE — 87480 CANDIDA DNA DIR PROBE: CPT | Performed by: NURSE PRACTITIONER

## 2021-11-15 RX ORDER — CLOBETASOL PROPIONATE 0.05 MG/G
1 GEL TOPICAL EVERY 12 HOURS SCHEDULED
Qty: 30 G | Refills: 1 | Status: SHIPPED | OUTPATIENT
Start: 2021-11-15 | End: 2022-12-14

## 2021-11-15 RX ORDER — HYDROCHLOROTHIAZIDE 25 MG/1
25 TABLET ORAL DAILY
Qty: 90 TABLET | Refills: 1 | Status: SHIPPED | OUTPATIENT
Start: 2021-11-15 | End: 2022-11-01 | Stop reason: SDUPTHER

## 2021-11-15 NOTE — PROGRESS NOTES
Female Physical / PAP Note      Patient Name: Lynn Stoner  : 1965   MRN: [id number]     Chief Complaint:    Chief Complaint   Patient presents with   • Gynecologic Exam       History of Present Illness: Lynn Stoner is a 56 y.o. female who is here today for their annual health maintenance and physical.    HTN : uncontrolled chronically. Pt reports compliance with lisinopril x 10 days.       Subjective      Review of Systems:   Review of Systems   Constitutional: Negative for activity change, appetite change, chills, fatigue, fever and unexpected weight change.   HENT: Negative for congestion, ear discharge, ear pain, facial swelling, hearing loss, nosebleeds, postnasal drip, rhinorrhea, sinus pressure, sinus pain, sneezing, sore throat, tinnitus, trouble swallowing and voice change.    Eyes: Negative for photophobia, pain, discharge, redness, itching and visual disturbance.   Respiratory: Negative for apnea, cough, choking, chest tightness, shortness of breath and wheezing.    Cardiovascular: Negative for chest pain and palpitations.   Gastrointestinal: Negative for abdominal distention, abdominal pain, blood in stool, constipation, diarrhea, nausea and vomiting.   Endocrine: Negative for cold intolerance, heat intolerance, polydipsia and polyphagia.   Genitourinary: Negative for difficulty urinating, dysuria, enuresis, flank pain, frequency, hematuria, pelvic pain and urgency.   Musculoskeletal: Negative for arthralgias, back pain, gait problem, joint swelling, myalgias, neck pain and neck stiffness.   Skin: Negative for pallor, rash and wound.   Allergic/Immunologic: Negative for environmental allergies and food allergies.   Neurological: Negative for dizziness, tremors, seizures, syncope, speech difficulty, weakness, light-headedness, numbness and headaches.   Hematological: Negative for adenopathy. Does not bruise/bleed easily.   Psychiatric/Behavioral: Negative for confusion, decreased  concentration, hallucinations and sleep disturbance. The patient is not nervous/anxious.       Breast - No tenderness, lumps, discharge, or blood from nipples.      Past Medical History, Social History, Family History and Care Team were all reviewed with patient and updated as appropriate.     Medications:     Current Outpatient Medications:   •  atorvastatin (Lipitor) 40 MG tablet, Take 1 tablet by mouth Every Night for 90 days., Disp: 90 tablet, Rfl: 0  •  glucose blood (Glucose Meter Test) test strip, check blood sugar every AM fasting and record. (Dx: E11.9), Disp: 100 each, Rfl: 3  •  Lancets 33G misc, 33 g Daily for 100 days., Disp: 100 each, Rfl: 3  •  levothyroxine (SYNTHROID, LEVOTHROID) 125 MCG tablet, Take 125 mcg by mouth Daily., Disp: , Rfl:   •  lisinopril (PRINIVIL,ZESTRIL) 20 MG tablet, Take 1 tablet by mouth Daily for 90 days., Disp: 90 tablet, Rfl: 0  •  metFORMIN ER (GLUCOPHAGE-XR) 500 MG 24 hr tablet, Take 1 tablet by mouth 2 (Two) Times a Day With Meals for 90 days., Disp: 180 tablet, Rfl: 0  •  clobetasol (TEMOVATE) 0.05 % gel, Apply 1 application topically to the appropriate area as directed Every 12 (Twelve) Hours., Disp: 30 g, Rfl: 1  •  fluconazole (Diflucan) 150 MG tablet, Take 1 tablet by mouth Every 3 (Three) Days., Disp: 2 tablet, Rfl: 0  •  hydroCHLOROthiazide (HYDRODIURIL) 25 MG tablet, Take 1 tablet by mouth Daily., Disp: 90 tablet, Rfl: 1    Allergies:   No Known Allergies      Colorectal Screening:     Last Completed Colonoscopy          COLORECTAL CANCER SCREENING (COLONOSCOPY - Every 10 Years) Next due on 12/29/2027 12/29/2017  COLONOSCOPY (Done)              Pap:    Last Completed Pap Smear          Ordered - PAP SMEAR (Every 3 Years) Ordered on 11/15/2021    10/25/2019  Outside Procedure: MS CYTOPATH CERV/VAG THIN LAYER               LMP: No LMP recorded. Patient has had an ablation.   Mammogram:    Last Completed Mammogram          Ordered - MAMMOGRAM (Every 2 Years)  "Ordered on 11/15/2021    03/12/2021  Mammo Screening Digital Tomosynthesis Bilateral With CAD    02/04/2020  Mammo Screening Digital Tomosynthesis Bilateral With CAD    01/29/2020  Outside Procedure: HC MAMMOGRAM SCREENING BILAT DIGITAL W CAD,INACTIVE -HC MAMMOGRAM SCREENING BILAT DIGITAL W CAD,CHG SCREENING DIGITAL BREAST TOMOSYNTHESIS BI                  Objective     Physical Exam:  Vital Signs:   Vitals:    11/15/21 1152   BP: (!) 178/101   BP Location: Left arm   Patient Position: Sitting   Cuff Size: Adult   Pulse: 105   Temp: 98.2 °F (36.8 °C)   TempSrc: Temporal   SpO2: 96%   Weight: 80.7 kg (178 lb)   Height: 162.6 cm (64.02\")     Body mass index is 30.54 kg/m².     Physical Exam  Exam conducted with a chaperone present.   Constitutional:       Appearance: She is well-developed.   HENT:      Head: Normocephalic and atraumatic.   Eyes:      General: No scleral icterus.        Right eye: No discharge.         Left eye: No discharge.      Conjunctiva/sclera: Conjunctivae normal.      Pupils: Pupils are equal, round, and reactive to light.   Neck:      Thyroid: No thyromegaly.   Cardiovascular:      Rate and Rhythm: Normal rate and regular rhythm.      Heart sounds: Normal heart sounds. No murmur heard.  No friction rub. No gallop.    Pulmonary:      Effort: Pulmonary effort is normal. No respiratory distress.      Breath sounds: Normal breath sounds. No wheezing or rales.   Chest:      Chest wall: No mass, deformity, swelling or tenderness.   Breasts: Breasts are symmetrical.      Right: No mass, nipple discharge, skin change or tenderness.      Left: No mass, nipple discharge, skin change or tenderness.       Abdominal:      General: Bowel sounds are normal. There is no distension.      Palpations: Abdomen is soft. There is no mass.      Tenderness: There is no abdominal tenderness. There is no guarding or rebound.   Genitourinary:     Exam position: Lithotomy position.      Labia:         Right: No rash.    "      Left: Rash present.       Vagina: Normal.      Cervix: Normal.      Uterus: Normal.       Adnexa: Right adnexa normal and left adnexa normal.      Rectum: Normal.          Comments: Pt c/o pruritis on left upper labia majora  Musculoskeletal:         General: No tenderness or deformity. Normal range of motion.   Lymphadenopathy:      Cervical: No cervical adenopathy.   Skin:     General: Skin is warm and dry.      Findings: No erythema or rash.   Neurological:      Mental Status: She is alert and oriented to person, place, and time.      Cranial Nerves: No cranial nerve deficit.      Sensory: No sensory deficit.      Motor: No abnormal muscle tone.      Coordination: Coordination normal.      Deep Tendon Reflexes: Reflexes normal.   Psychiatric:         Behavior: Behavior normal.         Thought Content: Thought content normal.         Judgment: Judgment normal.         Procedures    Assessment / Plan      Assessment/Plan:   Diagnoses and all orders for this visit:    1. Routine gynecological examination (Primary)  -     IgP, Aptima HPV  -     Gardnerella vaginalis, Trichomonas vaginalis, Candida albicans, DNA - Swab, Vagina    2. Visit for screening mammogram  -     Mammo Screening Digital Tomosynthesis Bilateral With CAD; Future    3. Dermatitis  -     clobetasol (TEMOVATE) 0.05 % gel; Apply 1 application topically to the appropriate area as directed Every 12 (Twelve) Hours.  Dispense: 30 g; Refill: 1    4. Essential hypertension  -     hydroCHLOROthiazide (HYDRODIURIL) 25 MG tablet; Take 1 tablet by mouth Daily.  Dispense: 90 tablet; Refill: 1               Follow Up:   Return for Keep follow up as scheduled.    Healthcare Maintenance:     Counseled on monthly breast self exams.    Counseled on diet and exercise.    Counseled on weightbearing exercise.    Recommend calcium with vitamin D twice daily.     Lynn Stoner voices understanding and acceptance of this advice and will call back with any further  questions or concerns. AVS with preventive healthcare tips printed for patient.     AIDEN Anderson        Please note that portions of this note may have been completed with a voice recognition program. Efforts were made to edit the dictations, but occasionally words are mistranscribed.

## 2021-11-16 DIAGNOSIS — B37.9 YEAST INFECTION: Primary | ICD-10-CM

## 2021-11-16 RX ORDER — FLUCONAZOLE 150 MG/1
150 TABLET ORAL
Qty: 2 TABLET | Refills: 0 | Status: SHIPPED | OUTPATIENT
Start: 2021-11-16 | End: 2022-12-14

## 2021-11-18 LAB
CYTOLOGIST CVX/VAG CYTO: NORMAL
CYTOLOGY CVX/VAG DOC CYTO: NORMAL
CYTOLOGY CVX/VAG DOC THIN PREP: NORMAL
DX ICD CODE: NORMAL
HIV 1 & 2 AB SER-IMP: NORMAL
HPV I/H RISK 4 DNA CVX QL PROBE+SIG AMP: NEGATIVE
OTHER STN SPEC: NORMAL
STAT OF ADQ CVX/VAG CYTO-IMP: NORMAL

## 2022-02-22 RX ORDER — LEVOTHYROXINE SODIUM 0.12 MG/1
125 TABLET ORAL
Qty: 90 TABLET | Refills: 3 | Status: SHIPPED | OUTPATIENT
Start: 2022-02-22 | End: 2022-11-01 | Stop reason: SDUPTHER

## 2022-02-22 NOTE — TELEPHONE ENCOUNTER
Caller: Lynn Stoner    Relationship: Self    Best call back number: [phone]     Requested Prescriptions:   Requested Prescriptions     Pending Prescriptions Disp Refills   • levothyroxine (SYNTHROID, LEVOTHROID) 125 MCG tablet       Sig: Take 1 tablet by mouth Daily.        Pharm[facility] where request [facility] be sent[facility]  [place] - [address] - [phone]  - [phone]   [facility]     Additional details provided by patient:     Does the patient have less than a 3 day supply:  [x] Yes  [] No

## 2022-03-17 ENCOUNTER — HOSPITAL ENCOUNTER (OUTPATIENT)
Dept: MAMMOGRAPHY | Facility: HOSPITAL | Age: 57
Discharge: HOME | End: 2022-03-17
Admitting: NURSE PRACTITIONER

## 2022-03-17 DIAGNOSIS — Z12.31 VISIT FOR SCREENING MAMMOGRAM: ICD-10-CM

## 2022-03-17 PROCEDURE — 77063 BREAST TOMOSYNTHESIS BI: CPT

## 2022-03-17 PROCEDURE — 77067 SCR MAMMO BI INCL CAD: CPT

## 2022-05-05 RX ORDER — LISINOPRIL 20 MG/1
20 TABLET ORAL DAILY
Qty: 90 TABLET | Refills: 1 | Status: SHIPPED | OUTPATIENT
Start: 2022-05-05 | End: 2022-09-21 | Stop reason: SDUPTHER

## 2022-05-05 NOTE — TELEPHONE ENCOUNTER
Caller: StonerLynn    Relationship: Self    Best call back number: [phone]    Requested Prescriptions:   Requested Prescriptions     Pending Prescriptions Disp Refills   • lisinopril (PRINIVIL,ZESTRIL) 20 MG tablet 90 tablet 0     Sig: Take 1 tablet by mouth Daily for 90 days.        Pharmacy where request should be sent: [facility] -  [place], [place] - [address] [phone] [facility] [phone] FX     Additional details provided by patient: Six days left on hand, patient states once returning from out of town will make a appointment.   Does the patient have less than a 3 day supply:  [] Yes  [x] No    Ras Vargas Rep   05/05/22 14:44 EDT         DELETE AFTER READING TO PATIENT: “Thank you for sharing this information with me. I will send a message to the clinical team. Please allow 48 hours for the clinical staff to follow up on this request.”

## 2022-09-21 ENCOUNTER — TELEPHONE (OUTPATIENT)
Dept: FAMILY MEDICINE CLINIC | Facility: CLINIC | Age: 57
End: 2022-09-21

## 2022-09-21 RX ORDER — LISINOPRIL 20 MG/1
20 TABLET ORAL DAILY
Qty: 90 TABLET | Refills: 3 | Status: SHIPPED | OUTPATIENT
Start: 2022-09-21 | End: 2022-11-01 | Stop reason: SDUPTHER

## 2022-09-21 NOTE — TELEPHONE ENCOUNTER
Caller: Lynn Stoner    Relationship: Self    Best call back number: [phone]    Requested Prescriptions:   Requested Prescriptions     Pending Prescriptions Disp Refills   • lisinopril (PRINIVIL,ZESTRIL) 20 MG tablet 90 tablet 1     Sig: Take 1 tablet by mouth Daily for 90 days.        Pharmacy where request should be sent: [facility] -  [place], [place] - [address] - [phone]  - [phone] FX       Does the patient have less than a 3 day supply:  [] Yes  [x] No    Ras Car Rep   09/21/22 08:40 EDT

## 2022-11-01 ENCOUNTER — OFFICE VISIT (OUTPATIENT)
Dept: FAMILY MEDICINE CLINIC | Facility: CLINIC | Age: 57
End: 2022-11-01

## 2022-11-01 VITALS
DIASTOLIC BLOOD PRESSURE: 92 MMHG | HEIGHT: 64 IN | SYSTOLIC BLOOD PRESSURE: 150 MMHG | BODY MASS INDEX: 30.39 KG/M2 | OXYGEN SATURATION: 97 % | WEIGHT: 178 LBS | HEART RATE: 110 BPM

## 2022-11-01 DIAGNOSIS — I10 ESSENTIAL HYPERTENSION: Primary | ICD-10-CM

## 2022-11-01 DIAGNOSIS — F41.1 GENERALIZED ANXIETY DISORDER: ICD-10-CM

## 2022-11-01 DIAGNOSIS — E03.9 ACQUIRED HYPOTHYROIDISM: ICD-10-CM

## 2022-11-01 DIAGNOSIS — G89.29 CHRONIC PAIN OF LEFT KNEE: ICD-10-CM

## 2022-11-01 DIAGNOSIS — E11.9 TYPE 2 DIABETES MELLITUS WITHOUT COMPLICATION, WITHOUT LONG-TERM CURRENT USE OF INSULIN: ICD-10-CM

## 2022-11-01 DIAGNOSIS — M25.562 CHRONIC PAIN OF LEFT KNEE: ICD-10-CM

## 2022-11-01 PROCEDURE — 99214 OFFICE O/P EST MOD 30 MIN: CPT | Performed by: FAMILY MEDICINE

## 2022-11-01 PROCEDURE — 83036 HEMOGLOBIN GLYCOSYLATED A1C: CPT | Performed by: FAMILY MEDICINE

## 2022-11-01 PROCEDURE — 36415 COLL VENOUS BLD VENIPUNCTURE: CPT | Performed by: FAMILY MEDICINE

## 2022-11-01 PROCEDURE — 84443 ASSAY THYROID STIM HORMONE: CPT | Performed by: FAMILY MEDICINE

## 2022-11-01 PROCEDURE — 80053 COMPREHEN METABOLIC PANEL: CPT | Performed by: FAMILY MEDICINE

## 2022-11-01 PROCEDURE — 84439 ASSAY OF FREE THYROXINE: CPT | Performed by: FAMILY MEDICINE

## 2022-11-01 PROCEDURE — 80061 LIPID PANEL: CPT | Performed by: FAMILY MEDICINE

## 2022-11-01 RX ORDER — ESCITALOPRAM OXALATE 10 MG/1
10 TABLET ORAL DAILY
Qty: 90 TABLET | Refills: 1 | Status: ON HOLD | OUTPATIENT
Start: 2022-11-01 | End: 2023-03-30

## 2022-11-01 RX ORDER — HYDROCHLOROTHIAZIDE 25 MG/1
25 TABLET ORAL DAILY
Qty: 90 TABLET | Refills: 1 | Status: ON HOLD | OUTPATIENT
Start: 2022-11-01 | End: 2023-03-30

## 2022-11-01 RX ORDER — LEVOTHYROXINE SODIUM 0.12 MG/1
125 TABLET ORAL
Qty: 90 TABLET | Refills: 3 | Status: SHIPPED | OUTPATIENT
Start: 2022-11-01 | End: 2023-03-30

## 2022-11-01 RX ORDER — LISINOPRIL 20 MG/1
20 TABLET ORAL DAILY
Qty: 90 TABLET | Refills: 3 | Status: SHIPPED | OUTPATIENT
Start: 2022-11-01 | End: 2023-03-30

## 2022-11-01 NOTE — ASSESSMENT & PLAN NOTE
Her blood pressure is somewhat elevated here today as well as outside the office.  She has been doing the lisinopril daily but not the hydrochlorothiazide.  We will refill both and update her labs.

## 2022-11-01 NOTE — ASSESSMENT & PLAN NOTE
Since she has gone through menopause she has noticed increased anxiety.  Go ahead and initiate some medication.

## 2022-11-01 NOTE — PROGRESS NOTES
Chief Complaint   Patient presents with   • Hypertension   • Knee Pain     Bilateral knee pain         Subjective     Lynn Stoner  has no past medical history on file.    Type 2 diabetes- she checks her blood sugar at home on a regular basis.  She states it ranges between 98 and 150.  She has been out of her test strips recently and out of her metformin for a while also.    Hypertension- he does check her blood pressure at home.  Her systolic runs anywhere from 1 30-1 60.  She has been taking her lisinopril daily.    Hypothyroid- she takes her levothyroxine every morning as directed.    Anxiety-she states she just has constantly felt somewhat nervous and anxious most of the time.  She does not relate it to anything in particular.      PHQ-2 Depression Screening  Little interest or pleasure in doing things?     Feeling down, depressed, or hopeless?     PHQ-2 Total Score     PHQ-9 Depression Screening  Little interest or pleasure in doing things?     Feeling down, depressed, or hopeless?     Trouble falling or staying asleep, or sleeping too much?     Feeling tired or having little energy?     Poor appetite or overeating?     Feeling bad about yourself - or that you are a failure or have let yourself or your family down?     Trouble concentrating on things, such as reading the newspaper or watching television?     Moving or speaking so slowly that other people could have noticed? Or the opposite - being so fidgety or restless that you have been moving around a lot more than usual?     Thoughts that you would be better off dead, or of hurting yourself in some way?     PHQ-9 Total Score     If you checked off any problems, how difficult have these problems made it for you to do your work, take care of things at home, or get along with other people?       No Known Allergies    Prior to Admission medications    Medication Sig Start Date End Date Taking? Authorizing Provider   clobetasol (TEMOVATE) 0.05 % gel Apply 1  application topically to the appropriate area as directed Every 12 (Twelve) Hours. 11/15/21  Yes Sofia Tucker APRN   glucose blood (Glucose Meter Test) test strip check blood sugar every AM fasting and record. (Dx: E11.9) 11/1/21  Yes Jerry Coto DO   lisinopril (PRINIVIL,ZESTRIL) 20 MG tablet Take 1 tablet by mouth Daily for 90 days. 9/21/22 12/20/22 Yes Jerry Coto DO   fluconazole (Diflucan) 150 MG tablet Take 1 tablet by mouth Every 3 (Three) Days. 11/16/21   Sofia Tucker APRN   hydroCHLOROthiazide (HYDRODIURIL) 25 MG tablet Take 1 tablet by mouth Daily. 11/15/21   Sofia Tucker APRN   levothyroxine (SYNTHROID, LEVOTHROID) 125 MCG tablet Take 1 tablet by mouth Every Morning for 90 days. 2/22/22 5/23/22  Jerry Coto DO   metFORMIN ER (GLUCOPHAGE-XR) 500 MG 24 hr tablet Take 1 tablet by mouth 2 (Two) Times a Day With Meals for 90 days. 11/2/21 1/31/22  Jerry Coto DO        Patient Active Problem List   Diagnosis   • Pap smear for cervical cancer screening   • Acquired hypothyroidism   • Diabetes mellitus, type 2 (HCC)   • Essential hypertension   • Post-menopause   • Vitamin D deficiency   • Generalized anxiety disorder   • Chronic pain of left knee        History reviewed. No pertinent surgical history.    Social History     Socioeconomic History   • Marital status:    Tobacco Use   • Smoking status: Never   • Smokeless tobacco: Never   Vaping Use   • Vaping Use: Never used   Substance and Sexual Activity   • Alcohol use: Never   • Drug use: Never   • Sexual activity: Defer       Family History   Problem Relation Age of Onset   • No Known Problems Mother    • No Known Problems Father    • No Known Problems Sister    • No Known Problems Brother    • No Known Problems Son    • No Known Problems Daughter    • No Known Problems Maternal Grandmother    • No Known Problems Maternal Grandfather    • No Known Problems Paternal Grandmother    • No  "Known Problems Paternal Grandfather    • No Known Problems Cousin    • No Known Problems Other    • Rheum arthritis Neg Hx    • Osteoarthritis Neg Hx    • Asthma Neg Hx    • Diabetes Neg Hx    • Heart failure Neg Hx    • Hyperlipidemia Neg Hx    • Hypertension Neg Hx    • Migraines Neg Hx    • Rashes / Skin problems Neg Hx    • Seizures Neg Hx    • Stroke Neg Hx    • Thyroid disease Neg Hx        Family history, surgical history, past medical history, Allergies and meds reviewed with patient today and updated in [facility] EMR.     ROS:  Review of Systems   Constitutional: Negative for fatigue.   HENT: Negative for congestion, postnasal drip and rhinorrhea.    Eyes: Negative for blurred vision and visual disturbance.   Respiratory: Negative for cough, chest tightness, shortness of breath and wheezing.    Cardiovascular: Negative for chest pain and palpitations.   Gastrointestinal: Negative for constipation and diarrhea.   Endocrine: Negative for polydipsia and polyuria.   Allergic/Immunologic: Negative for environmental allergies.   Neurological: Negative for headache.   Psychiatric/Behavioral: Negative for depressed mood. The patient is nervous/anxious.        OBJECTIVE:  Vitals:    11/01/22 1528   BP: 150/92   Pulse: 110   SpO2: 97%   Weight: 80.7 kg (178 lb)   Height: 162.6 cm (64.02\")     No results found.   Body mass index is 30.53 kg/m².  No LMP recorded. Patient has had an ablation.    Physical Exam  Vitals and nursing note reviewed.   Constitutional:       General: She is not in acute distress.     Appearance: Normal appearance. She is normal weight.   HENT:      Head: Normocephalic.      Right Ear: Tympanic membrane, ear canal and external ear normal.      Left Ear: Tympanic membrane, ear canal and external ear normal.      Nose: Nose normal.      Mouth/Throat:      Mouth: Mucous membranes are moist.      Pharynx: Oropharynx is clear.   Eyes:      General: No scleral icterus.     Conjunctiva/sclera: Conjunctivae " normal.      Pupils: Pupils are equal, round, and reactive to light.        Comments: Cystic lesion   Cardiovascular:      Rate and Rhythm: Normal rate and regular rhythm.      Pulses: Normal pulses.      Heart sounds: Normal heart sounds. No murmur heard.  Pulmonary:      Effort: Pulmonary effort is normal.      Breath sounds: Normal breath sounds. No wheezing, rhonchi or rales.   Musculoskeletal:      Cervical back: Neck supple. No rigidity or tenderness.      Left knee: No swelling, deformity, effusion or crepitus. Normal range of motion. Tenderness present over the medial joint line. No LCL laxity, MCL laxity, ACL laxity or PCL laxity.     Instability Tests: Anterior drawer test negative. Posterior drawer test negative. Anterior Lachman test negative. Medial Karmen test negative and lateral Karmen test negative.   Lymphadenopathy:      Cervical: No cervical adenopathy.   Skin:     General: Skin is warm and dry.      Coloration: Skin is not jaundiced.      Findings: No rash.   Neurological:      General: No focal deficit present.      Mental Status: She is alert and oriented to person, place, and time.   Psychiatric:         Mood and Affect: Mood normal.         Thought Content: Thought content normal.         Judgment: Judgment normal.         Procedures    No visits with results within 30 Day(s) from this visit.   Latest known visit with results is:   Office Visit on 11/15/2021   Component Date Value Ref Range Status   • Diagnosis 11/15/2021 Comment   Final    NEGATIVE FOR INTRAEPITHELIAL LESION OR MALIGNANCY.   • Specimen adequacy: 11/15/2021 Comment   Final    Satisfactory for evaluation. No endocervical component is identified.   • Clinician Provided ICD-10: 11/15/2021 Comment   Final    Z01.419   • Performed by: 11/15/2021 Comment   Final    Lucila Moody, Cytotechnologist (ASCP)   • . 11/15/2021 .   Final   • Note: 11/15/2021 Comment   Final    The Pap smear is a screening test designed to aid in the  detection of  premalignant and malignant conditions of the uterine cervix.  It is not a  diagnostic procedure and should not be used as the sole means of detecting  cervical cancer.  Both false-positive and false-negative reports do occur.   • Method: 11/15/2021 Comment   Final    This liquid based ThinPrep(R) pap test was screened with the  use of an image guided system.   • HPV Aptima 11/15/2021 Negative  Negative Final    This nucleic acid amplification test detects fourteen high-risk  HPV types (16,18,31,33,35,39,45,51,52,56,58,59,66,68) without  differentiation.   • GARDNERELLA VAGINALIS 11/15/2021 Positive (A)  Negative Final   • TRICHOMONAS VAGINALIS 11/15/2021 Negative  Negative Final   • CANDIDA SPECIES 11/15/2021 Negative  Negative Final       ASSESSMENT/ PLAN:    Diagnoses and all orders for this visit:    1. Essential hypertension (Primary)  Assessment & Plan:  Her blood pressure is somewhat elevated here today as well as outside the office.  She has been doing the lisinopril daily but not the hydrochlorothiazide.  We will refill both and update her labs.    Orders:  -     hydroCHLOROthiazide (HYDRODIURIL) 25 MG tablet; Take 1 tablet by mouth Daily.  Dispense: 90 tablet; Refill: 1  -     Comprehensive Metabolic Panel  -     Lipid Panel    2. Acquired hypothyroidism  Assessment & Plan:  Update her thyroid profile with her labs here today.    Orders:  -     TSH+Free T4    3. Type 2 diabetes mellitus without complication, without long-term current use of insulin (HCC)  Assessment & Plan:  We will update her diabetic labs here today.  Depending upon her A1c whether we need to reinitiate medication or not.    Orders:  -     Comprehensive Metabolic Panel  -     Lipid Panel  -     Hemoglobin A1c  -     Microalbumin / Creatinine Urine Ratio - Urine, Clean Catch    4. Generalized anxiety disorder  Assessment & Plan:  Since she has gone through menopause she has noticed increased anxiety.  Go ahead and initiate  some medication.      5. Chronic pain of left knee  Assessment & Plan:  She has some knee pain.  This is worse with activity walking and going up and down stairs.  She does notice some crepitance.  Her exam is abnormal for some medial joint line tenderness.  Most likely has some medial joint osteoarthritis.  We will get an x-ray of her left knee.    Orders:  -     XR Knee 3 View Left; Future    Other orders  -     lisinopril (PRINIVIL,ZESTRIL) 20 MG tablet; Take 1 tablet by mouth Daily for 90 days.  Dispense: 90 tablet; Refill: 3  -     levothyroxine (SYNTHROID, LEVOTHROID) 125 MCG tablet; Take 1 tablet by mouth Every Morning for 90 days.  Dispense: 90 tablet; Refill: 3  -     glucose blood (Glucose Meter Test) test strip; check blood sugar every AM fasting and record. (Dx: E11.9)  Dispense: 100 each; Refill: 3  -     escitalopram (Lexapro) 10 MG tablet; Take 1 tablet by mouth Daily for 90 days.  Dispense: 90 tablet; Refill: 1      Orders Placed Today:     New Medications Ordered This Visit   Medications   • lisinopril (PRINIVIL,ZESTRIL) 20 MG tablet     Sig: Take 1 tablet by mouth Daily for 90 days.     Dispense:  90 tablet     Refill:  3   • hydroCHLOROthiazide (HYDRODIURIL) 25 MG tablet     Sig: Take 1 tablet by mouth Daily.     Dispense:  90 tablet     Refill:  1   • levothyroxine (SYNTHROID, LEVOTHROID) 125 MCG tablet     Sig: Take 1 tablet by mouth Every Morning for 90 days.     Dispense:  90 tablet     Refill:  3   • glucose blood (Glucose Meter Test) test strip     Sig: check blood sugar every AM fasting and record. (Dx: E11.9)     Dispense:  100 each     Refill:  3   • escitalopram (Lexapro) 10 MG tablet     Sig: Take 1 tablet by mouth Daily for 90 days.     Dispense:  90 tablet     Refill:  1        Management Plan:     An After Visit Summary was printed and given to the patient at discharge.    Follow-up: Return in about 3 months (around 2/1/2023) for Recheck.    Jerry Coto,  11/1/2022 16:28  EDT  This note was electronically signed.

## 2022-11-01 NOTE — ASSESSMENT & PLAN NOTE
We will update her diabetic labs here today.  Depending upon her A1c whether we need to reinitiate medication or not.

## 2022-11-01 NOTE — ASSESSMENT & PLAN NOTE
She has some knee pain.  This is worse with activity walking and going up and down stairs.  She does notice some crepitance.  Her exam is abnormal for some medial joint line tenderness.  Most likely has some medial joint osteoarthritis.  We will get an x-ray of her left knee.

## 2022-11-02 LAB
ALBUMIN SERPL-MCNC: 4.5 G/DL (ref 3.5–5.2)
ALBUMIN/GLOB SERPL: 1.2 G/DL
ALP SERPL-CCNC: 101 U/L (ref 39–117)
ALT SERPL W P-5'-P-CCNC: 27 U/L (ref 1–33)
ANION GAP SERPL CALCULATED.3IONS-SCNC: 11.3 MMOL/L (ref 5–15)
AST SERPL-CCNC: 28 U/L (ref 1–32)
BILIRUB SERPL-MCNC: 0.6 MG/DL (ref 0–1.2)
BUN SERPL-MCNC: 8 MG/DL (ref 6–20)
BUN/CREAT SERPL: 12.3 (ref 7–25)
CALCIUM SPEC-SCNC: 9.9 MG/DL (ref 8.6–10.5)
CHLORIDE SERPL-SCNC: 98 MMOL/L (ref 98–107)
CHOLEST SERPL-MCNC: 195 MG/DL (ref 0–200)
CO2 SERPL-SCNC: 27.7 MMOL/L (ref 22–29)
CREAT SERPL-MCNC: 0.65 MG/DL (ref 0.57–1)
EGFRCR SERPLBLD CKD-EPI 2021: 104.1 ML/MIN/1.73
GLOBULIN UR ELPH-MCNC: 3.7 GM/DL
GLUCOSE SERPL-MCNC: 85 MG/DL (ref 65–99)
HBA1C MFR BLD: 7.9 % (ref 4.8–5.6)
HDLC SERPL-MCNC: 58 MG/DL (ref 40–60)
LDLC SERPL CALC-MCNC: 128 MG/DL (ref 0–100)
LDLC/HDLC SERPL: 2.2 {RATIO}
POTASSIUM SERPL-SCNC: 4 MMOL/L (ref 3.5–5.2)
PROT SERPL-MCNC: 8.2 G/DL (ref 6–8.5)
SODIUM SERPL-SCNC: 137 MMOL/L (ref 136–145)
T4 FREE SERPL-MCNC: 1.82 NG/DL (ref 0.93–1.7)
TRIGL SERPL-MCNC: 47 MG/DL (ref 0–150)
TSH SERPL DL<=0.05 MIU/L-ACNC: 2.66 UIU/ML (ref 0.27–4.2)
VLDLC SERPL-MCNC: 9 MG/DL (ref 5–40)

## 2022-11-03 RX ORDER — METFORMIN HYDROCHLORIDE 500 MG/1
500 TABLET, EXTENDED RELEASE ORAL 2 TIMES DAILY WITH MEALS
Qty: 180 TABLET | Refills: 1 | Status: ON HOLD | OUTPATIENT
Start: 2022-11-03 | End: 2023-03-30

## 2022-11-03 RX ORDER — ATORVASTATIN CALCIUM 10 MG/1
10 TABLET, FILM COATED ORAL DAILY
Qty: 90 TABLET | Refills: 1 | Status: SHIPPED | OUTPATIENT
Start: 2022-11-03 | End: 2023-02-03 | Stop reason: SINTOL

## 2022-12-12 NOTE — PROGRESS NOTES
Female Physical / PAP Note      Patient Name: Lynn Stoner  : 1965   MRN: [id number]     Chief Complaint:    Chief Complaint   Patient presents with   • Gynecologic Exam       History of Present Illness: Lynn Stoner is a 57 y.o. female who is here today for their annual health maintenance and physical.      Subjective      Review of Systems:   Review of Systems   Constitutional: Negative for activity change, appetite change, chills, fatigue, fever and unexpected weight change.   HENT: Negative for congestion, ear discharge, ear pain, facial swelling, hearing loss, nosebleeds, postnasal drip, rhinorrhea, sinus pressure, sinus pain, sneezing, sore throat, tinnitus, trouble swallowing and voice change.    Eyes: Negative for photophobia, pain, discharge, redness, itching and visual disturbance.   Respiratory: Negative for apnea, cough, choking, chest tightness, shortness of breath and wheezing.    Cardiovascular: Negative for chest pain and palpitations.   Gastrointestinal: Negative for abdominal distention, abdominal pain, blood in stool, constipation, diarrhea, nausea and vomiting.   Endocrine: Negative for cold intolerance, heat intolerance, polydipsia and polyphagia.   Genitourinary: Negative for difficulty urinating, dysuria, enuresis, flank pain, frequency, hematuria, pelvic pain and urgency.   Musculoskeletal: Negative for arthralgias, back pain, gait problem, joint swelling, myalgias, neck pain and neck stiffness.   Skin: Negative for pallor, rash and wound.   Allergic/Immunologic: Negative for environmental allergies and food allergies.   Neurological: Negative for dizziness, tremors, seizures, syncope, speech difficulty, weakness, light-headedness, numbness and headaches.   Hematological: Negative for adenopathy. Does not bruise/bleed easily.   Psychiatric/Behavioral: Negative for confusion, decreased concentration, hallucinations and sleep disturbance. The patient is not nervous/anxious.        Breast - + tenderness. Denies any lumps, discharge, or blood from nipples.      Past Medical History, Social History, Family History and Care Team were all reviewed with patient and updated as appropriate.     Medications:     Current Outpatient Medications:   •  atorvastatin (LIPITOR) 10 MG tablet, Take 1 tablet by mouth Daily., Disp: 90 tablet, Rfl: 1  •  escitalopram (Lexapro) 10 MG tablet, Take 1 tablet by mouth Daily for 90 days., Disp: 90 tablet, Rfl: 1  •  glucose blood (Glucose Meter Test) test strip, check blood sugar every AM fasting and record. (Dx: E11.9), Disp: 100 each, Rfl: 3  •  hydroCHLOROthiazide (HYDRODIURIL) 25 MG tablet, Take 1 tablet by mouth Daily., Disp: 90 tablet, Rfl: 1  •  levothyroxine (SYNTHROID, LEVOTHROID) 125 MCG tablet, Take 1 tablet by mouth Every Morning for 90 days., Disp: 90 tablet, Rfl: 3  •  lisinopril (PRINIVIL,ZESTRIL) 20 MG tablet, Take 1 tablet by mouth Daily for 90 days., Disp: 90 tablet, Rfl: 3  •  metFORMIN ER (GLUCOPHAGE-XR) 500 MG 24 hr tablet, Take 1 tablet by mouth 2 (Two) Times a Day With Meals for 90 days., Disp: 180 tablet, Rfl: 1  •  Lancets (freestyle) lancets, , Disp: , Rfl:     Allergies:   No Known Allergies      Colorectal Screening:     Last Completed Colonoscopy          Ordered - COLORECTAL CANCER SCREENING (COLONOSCOPY - Every 5 Years) Ordered on 12/14/2022 12/29/2017  COLONOSCOPY (Done)    12/29/2017  SCANNED - COLONOSCOPY               PAP:    Last Completed Pap Smear          Ordered - PAP SMEAR (Every 3 Years) Ordered on 12/14/2022    11/15/2021  IgP, Aptima HPV    10/25/2019  Outside Procedure: NM CYTOPATH CERV/VAG THIN LAYER               LMP: No LMP recorded. Patient has had an ablation.   Mammogram:    Last Completed Mammogram          Ordered - MAMMOGRAM (Every 2 Years) Ordered on 12/14/2022 03/17/2022  Mammo Screening Digital Tomosynthesis Bilateral With CAD    03/12/2021  Mammo Screening Digital Tomosynthesis Bilateral With CAD     02/04/2020  Mammo Screening Digital Tomosynthesis Bilateral With CAD    01/29/2020  Outside Procedure: HC MAMMOGRAM SCREENING BILAT DIGITAL W CAD,INACTIVE -HC MAMMOGRAM SCREENING BILAT DIGITAL W CAD,CHG SCREENING DIGITAL BREAST TOMOSYNTHESIS BI                 Depression: PHQ-2 Depression Screening  Little interest or pleasure in doing things?  0     Feeling down, depressed, or hopeless?  0     PHQ-2 Total Score  0       Objective     Physical Exam:  Vital Signs:   Vitals:    12/14/22 0708 12/14/22 0713   BP: 178/88 165/75   Pulse: 91    Temp: 97 °F (36.1 °C)    TempSrc: Temporal    SpO2: 98%    Weight: 80.7 kg (178 lb)      Body mass index is 30.53 kg/m².     Physical Exam  Vitals reviewed. Exam conducted with a chaperone present.   Constitutional:       Appearance: She is well-developed.   HENT:      Head: Normocephalic and atraumatic.   Eyes:      General: No scleral icterus.        Right eye: No discharge.         Left eye: No discharge.      Conjunctiva/sclera: Conjunctivae normal.      Pupils: Pupils are equal, round, and reactive to light.   Neck:      Thyroid: No thyromegaly.   Cardiovascular:      Rate and Rhythm: Normal rate and regular rhythm.      Heart sounds: Normal heart sounds. No murmur heard.    No friction rub. No gallop.   Pulmonary:      Effort: Pulmonary effort is normal. No respiratory distress.      Breath sounds: Normal breath sounds. No wheezing or rales.   Chest:      Chest wall: No mass, deformity, swelling or tenderness.   Breasts:     Breasts are symmetrical.      Right: No mass, nipple discharge, skin change or tenderness.      Left: No mass, nipple discharge, skin change or tenderness.   Abdominal:      General: Bowel sounds are normal. There is no distension.      Palpations: Abdomen is soft. There is no mass.      Tenderness: There is no abdominal tenderness. There is no guarding or rebound.      Hernia: There is no hernia in the left inguinal area or right inguinal area.    Genitourinary:     Exam position: Lithotomy position.      Pubic Area: No rash.       Labia:         Right: No rash.         Left: No rash.       Urethra: No prolapse.      Vagina: Normal. No vaginal discharge.      Cervix: Normal.      Uterus: Normal.       Adnexa: Right adnexa normal and left adnexa normal.        Right: No mass or tenderness.          Left: No mass or tenderness.        Rectum: Normal.   Musculoskeletal:         General: No tenderness or deformity. Normal range of motion.   Lymphadenopathy:      Cervical: No cervical adenopathy.      Lower Body: No right inguinal adenopathy. No left inguinal adenopathy.   Skin:     General: Skin is warm and dry.      Findings: No erythema or rash.   Neurological:      Mental Status: She is alert and oriented to person, place, and time.      Cranial Nerves: No cranial nerve deficit.      Sensory: No sensory deficit.      Motor: No abnormal muscle tone.      Coordination: Coordination normal.      Deep Tendon Reflexes: Reflexes normal.   Psychiatric:         Behavior: Behavior normal.         Thought Content: Thought content normal.         Judgment: Judgment normal.         Procedures    Assessment / Plan      Assessment/Plan:   Diagnoses and all orders for this visit:    1. Routine gynecological examination (Primary)  -     IgP, Aptima HPV  -     Gardnerella vaginalis, Trichomonas vaginalis, Candida albicans, DNA - Swab, Vagina    2. Visit for screening mammogram  -     Mammo Screening Digital Tomosynthesis Bilateral With CAD; Future    3. Colon cancer screening  -     Ambulatory Referral For Screening Colonoscopy    4. Personal history of colonic polyps  -     Ambulatory Referral For Screening Colonoscopy               Follow Up:   Return for Pending results.    Healthcare Maintenance:     Counseled on monthly breast self exams.    Counseled on diet and exercise.    Counseled on weightbearing exercise.    Recommend calcium with vitamin D twice daily.      Lynn Stoner voices understanding and acceptance of this advice and will call back with any further questions or concerns. AVS with preventive healthcare tips printed for patient.     AIDEN Anderson

## 2022-12-14 ENCOUNTER — OFFICE VISIT (OUTPATIENT)
Dept: FAMILY MEDICINE CLINIC | Facility: CLINIC | Age: 57
End: 2022-12-14

## 2022-12-14 VITALS
BODY MASS INDEX: 30.53 KG/M2 | SYSTOLIC BLOOD PRESSURE: 165 MMHG | HEART RATE: 91 BPM | WEIGHT: 178 LBS | DIASTOLIC BLOOD PRESSURE: 75 MMHG | OXYGEN SATURATION: 98 % | TEMPERATURE: 97 F

## 2022-12-14 DIAGNOSIS — Z12.11 COLON CANCER SCREENING: ICD-10-CM

## 2022-12-14 DIAGNOSIS — Z86.010 PERSONAL HISTORY OF COLONIC POLYPS: ICD-10-CM

## 2022-12-14 DIAGNOSIS — Z01.419 ROUTINE GYNECOLOGICAL EXAMINATION: Primary | ICD-10-CM

## 2022-12-14 DIAGNOSIS — Z12.31 VISIT FOR SCREENING MAMMOGRAM: ICD-10-CM

## 2022-12-14 PROCEDURE — 87624 HPV HI-RISK TYP POOLED RSLT: CPT | Performed by: NURSE PRACTITIONER

## 2022-12-14 PROCEDURE — 99396 PREV VISIT EST AGE 40-64: CPT | Performed by: NURSE PRACTITIONER

## 2022-12-14 PROCEDURE — 87480 CANDIDA DNA DIR PROBE: CPT | Performed by: NURSE PRACTITIONER

## 2022-12-14 PROCEDURE — 87510 GARDNER VAG DNA DIR PROBE: CPT | Performed by: NURSE PRACTITIONER

## 2022-12-14 PROCEDURE — G0123 SCREEN CERV/VAG THIN LAYER: HCPCS | Performed by: NURSE PRACTITIONER

## 2022-12-14 PROCEDURE — 87660 TRICHOMONAS VAGIN DIR PROBE: CPT | Performed by: NURSE PRACTITIONER

## 2022-12-14 RX ORDER — LANCETS 28 GAUGE
EACH MISCELLANEOUS
COMMUNITY
Start: 2022-10-03

## 2022-12-14 RX ORDER — METRONIDAZOLE 500 MG/1
500 TABLET ORAL 2 TIMES DAILY
Qty: 14 TABLET | Refills: 0 | Status: ON HOLD | OUTPATIENT
Start: 2022-12-14 | End: 2023-03-30

## 2023-01-06 ENCOUNTER — PREP FOR SURGERY (OUTPATIENT)
Dept: OTHER | Facility: HOSPITAL | Age: 58
End: 2023-01-06
Payer: OTHER GOVERNMENT

## 2023-01-06 ENCOUNTER — OFFICE VISIT (OUTPATIENT)
Dept: SURGERY | Facility: CLINIC | Age: 58
End: 2023-01-06
Payer: OTHER GOVERNMENT

## 2023-01-06 VITALS — HEART RATE: 86 BPM | BODY MASS INDEX: 30.22 KG/M2 | WEIGHT: 177 LBS | HEIGHT: 64 IN

## 2023-01-06 DIAGNOSIS — Z86.010 HISTORY OF COLONIC POLYPS: ICD-10-CM

## 2023-01-06 DIAGNOSIS — Z12.11 SCREENING FOR MALIGNANT NEOPLASM OF COLON: Primary | ICD-10-CM

## 2023-01-06 PROBLEM — Z86.0100 HISTORY OF COLONIC POLYPS: Status: ACTIVE | Noted: 2023-01-06

## 2023-01-06 PROCEDURE — S0260 H&P FOR SURGERY: HCPCS | Performed by: NURSE PRACTITIONER

## 2023-01-06 NOTE — PROGRESS NOTES
Chief Complaint: Colonoscopy (consult)    Subjective      Colonoscopy consultation       History of Present Illness  Lynn Stoner is a 57 y.o. female presents to [facility] for colonoscopy consultation.    Patient presents today on referral from Sofia Looney for colonoscopy consultation.    She denies any abdominal pain, change in bowel habit, or rectal bleeding.    Denies any family history of colorectal cancer.      12/17: Colonoscopy ([facility]): Hepatic flexure-tubular adenoma & melanosis coli.    Objective     Past Medical History:   Diagnosis Date   • Diabetes (HCC)    • Hyperlipidemia    • Hypertension    • Hypothyroid        Past Surgical History:   Procedure Laterality Date   • ENDOMETRIAL ABLATION W/ NOVASURE     • LEEP         Outpatient Medications Marked as Taking for the 1/6/23 encounter (Office Visit) with Jayla Mensah, APRMIMI   Medication Sig Dispense Refill   • atorvastatin (LIPITOR) 10 MG tablet Take 1 tablet by mouth Daily. 90 tablet 1   • escitalopram (Lexapro) 10 MG tablet Take 1 tablet by mouth Daily for 90 days. 90 tablet 1   • glucose blood (Glucose Meter Test) test strip check blood sugar every AM fasting and record. (Dx: E11.9) 100 each 3   • hydroCHLOROthiazide (HYDRODIURIL) 25 MG tablet Take 1 tablet by mouth Daily. 90 tablet 1   • Lancets (freestyle) lancets      • levothyroxine (SYNTHROID, LEVOTHROID) 125 MCG tablet Take 1 tablet by mouth Every Morning for 90 days. 90 tablet 3   • lisinopril (PRINIVIL,ZESTRIL) 20 MG tablet Take 1 tablet by mouth Daily for 90 days. 90 tablet 3   • metFORMIN ER (GLUCOPHAGE-XR) 500 MG 24 hr tablet Take 1 tablet by mouth 2 (Two) Times a Day With Meals for 90 days. 180 tablet 1   • metroNIDAZOLE (Flagyl) 500 MG tablet Take 1 tablet by mouth 2 (Two) Times a Day. 14 tablet 0       No Known Allergies     Family History   Problem Relation Age of Onset   • No Known Problems Mother    • No Known Problems Father    • No  Known Problems Sister    • No Known Problems Brother    • No Known Problems Son    • No Known Problems Daughter    • No Known Problems Maternal Grandmother    • No Known Problems Maternal Grandfather    • No Known Problems Paternal Grandmother    • No Known Problems Paternal Grandfather    • No Known Problems Cousin    • No Known Problems Other    • Rheum arthritis Neg Hx    • Osteoarthritis Neg Hx    • Asthma Neg Hx    • Diabetes Neg Hx    • Heart failure Neg Hx    • Hyperlipidemia Neg Hx    • Hypertension Neg Hx    • Migraines Neg Hx    • Rashes / Skin problems Neg Hx    • Seizures Neg Hx    • Stroke Neg Hx    • Thyroid disease Neg Hx        Social History     Socioeconomic History   • Marital status:    Tobacco Use   • Smoking status: Never   • Smokeless tobacco: Never   Vaping Use   • Vaping Use: Never used   Substance and Sexual Activity   • Alcohol use: Never   • Drug use: Never   • Sexual activity: Defer       Review of Systems   Constitutional: Negative for chills and fever.   Gastrointestinal: Negative for abdominal distention, abdominal pain, anal bleeding, blood in stool, constipation, diarrhea and rectal pain.        Vital Signs:   Pulse 86   Ht 162.6 cm (64\")   Wt 80.3 kg (177 lb)   BMI 30.38 kg/m²      Physical Exam  Vitals and nursing note reviewed.   Constitutional:       General: She is not in acute distress.     Appearance: Normal appearance.   HENT:      Head: Normocephalic.   Cardiovascular:      Rate and Rhythm: Normal rate.   Pulmonary:      Effort: Pulmonary effort is normal.      Breath sounds: No stridor.   Abdominal:      Palpations: Abdomen is soft.      Tenderness: There is no guarding.   Musculoskeletal:         General: Normal range of motion.   Skin:     General: Skin is warm and dry.      Coloration: Skin is not jaundiced.   Neurological:      General: No focal deficit present.      Mental Status: She is alert and oriented to person, place, and time.   Psychiatric:          Mood and Affect: Mood normal.         Thought Content: Thought content normal.          Result Review :          []  Laboratory  []  Radiology  [x]  Pathology  []  Microbiology  []  EKG/Telemetry   []  Cardiology/Vascular   [x]  Old records  Today I reviewed Dr. Montero's previous colonoscopy and pathology.     Assessment and Plan    Diagnoses and all orders for this visit:    1. Screening for malignant neoplasm of colon (Primary)    2. History of colonic polyps        Follow Up   Return for Schedule colonoscopy with Dr. Marcelo on 3/30/2023 at [facility], [place], operated by [facility].     Phone PAT.  Hospital call with arrival time.    Possible risks/complications, benefits, and alternatives to surgical or invasive procedure have been explained to patient and/or legal guardian.     Patient has been evaluated and can tolerate anesthesia and/or sedation. Risks, benefits, and alternatives to anesthesia and sedation have been explained to patient and/or legal guardian.  Patient verbalizes understanding and is willing to proceed with above plan.     The office note was dictated with the help of the dragon dictation system.     Patient was given instructions and counseling regarding her condition or for health maintenance advice. Please see specific information pulled into the AVS if appropriate.

## 2023-01-11 ENCOUNTER — TELEPHONE (OUTPATIENT)
Dept: SURGERY | Facility: CLINIC | Age: 58
End: 2023-01-11
Payer: OTHER GOVERNMENT

## 2023-01-11 RX ORDER — BISACODYL 5 MG
TABLET, DELAYED RELEASE (ENTERIC COATED) ORAL
Qty: 6 TABLET | Refills: 0 | Status: ON HOLD | OUTPATIENT
Start: 2023-01-11 | End: 2023-03-30

## 2023-01-11 RX ORDER — POLYETHYLENE GLYCOL 3350 17 G/17G
POWDER, FOR SOLUTION ORAL
Qty: 238 PACKET | Refills: 0 | Status: ON HOLD | OUTPATIENT
Start: 2023-01-11 | End: 2023-03-30

## 2023-01-11 NOTE — TELEPHONE ENCOUNTER
Patient consulted April for colonoscopy with Dr. Marcelo on 03/30/23.  She said her prescription for her bowel prep was not sent to [facility].

## 2023-01-13 ENCOUNTER — TELEPHONE (OUTPATIENT)
Dept: FAMILY MEDICINE CLINIC | Facility: CLINIC | Age: 58
End: 2023-01-13

## 2023-01-13 NOTE — TELEPHONE ENCOUNTER
Caller: Lynn Stoner    Relationship: Self    Best call back number: [phone]    What orders are you requesting (i.e. lab or imaging): YEARLY MAMMOGRAM    In what timeframe would the patient need to come in: AFTER 03/17/2023    Where will you receive your lab/imaging services: [facility]    Additional notes: PLEASE NOTIFY WHEN ORDERS ARE SENT

## 2023-01-13 NOTE — TELEPHONE ENCOUNTER
Released previously ordered mammogram from [facility] in December. Called and notified patient.

## 2023-01-16 ENCOUNTER — TELEPHONE (OUTPATIENT)
Dept: FAMILY MEDICINE CLINIC | Facility: CLINIC | Age: 58
End: 2023-01-16

## 2023-01-16 ENCOUNTER — HOSPITAL ENCOUNTER (OUTPATIENT)
Dept: GENERAL RADIOLOGY | Facility: HOSPITAL | Age: 58
Discharge: HOME | End: 2023-01-16
Admitting: FAMILY MEDICINE
Payer: OTHER GOVERNMENT

## 2023-01-16 DIAGNOSIS — M25.562 CHRONIC PAIN OF LEFT KNEE: ICD-10-CM

## 2023-01-16 DIAGNOSIS — G89.29 CHRONIC PAIN OF LEFT KNEE: ICD-10-CM

## 2023-01-16 PROCEDURE — 73562 X-RAY EXAM OF KNEE 3: CPT

## 2023-01-16 NOTE — TELEPHONE ENCOUNTER
Caller: Lynn Stoner    Relationship to patient: Self    Best call back number: [phone]    Patient is needing: PATIENT CALLED IN AND IS REQUESTING A CALL BACK TO SEE IF HER X-RAY ORDER FROM November IS STILL VALID. PATIENT SAID IT IS OKAY TO LEAVE A MESSAGE ON PHONE

## 2023-02-03 ENCOUNTER — OFFICE VISIT (OUTPATIENT)
Dept: FAMILY MEDICINE CLINIC | Facility: CLINIC | Age: 58
End: 2023-02-03
Payer: OTHER GOVERNMENT

## 2023-02-03 VITALS
HEART RATE: 84 BPM | DIASTOLIC BLOOD PRESSURE: 100 MMHG | BODY MASS INDEX: 30.05 KG/M2 | HEIGHT: 64 IN | OXYGEN SATURATION: 99 % | TEMPERATURE: 98.4 F | SYSTOLIC BLOOD PRESSURE: 158 MMHG | WEIGHT: 176 LBS

## 2023-02-03 DIAGNOSIS — E03.9 ACQUIRED HYPOTHYROIDISM: ICD-10-CM

## 2023-02-03 DIAGNOSIS — E11.9 TYPE 2 DIABETES MELLITUS WITHOUT COMPLICATION, WITHOUT LONG-TERM CURRENT USE OF INSULIN: ICD-10-CM

## 2023-02-03 DIAGNOSIS — I10 ESSENTIAL HYPERTENSION: Primary | ICD-10-CM

## 2023-02-03 DIAGNOSIS — E78.5 HYPERLIPIDEMIA, UNSPECIFIED HYPERLIPIDEMIA TYPE: ICD-10-CM

## 2023-02-03 PROCEDURE — 99214 OFFICE O/P EST MOD 30 MIN: CPT | Performed by: FAMILY MEDICINE

## 2023-02-03 RX ORDER — AMLODIPINE BESYLATE 5 MG/1
5 TABLET ORAL DAILY
Qty: 90 TABLET | Refills: 1 | Status: ON HOLD | OUTPATIENT
Start: 2023-02-03 | End: 2023-03-30

## 2023-02-03 RX ORDER — POLYETHYLENE GLYCOL 3350 17 G/17G
POWDER ORAL
Status: ON HOLD | COMMUNITY
Start: 2023-01-11 | End: 2023-03-30

## 2023-02-03 NOTE — ASSESSMENT & PLAN NOTE
Her blood pressure here is elevated today.  Even her home blood pressure readings are elevated at times.  We will add some amlodipine daily to her regiment.

## 2023-02-03 NOTE — ASSESSMENT & PLAN NOTE
We will update her A1c with her labs.  She needs to continue to work on her diet and moderating her carbohydrates.

## 2023-02-03 NOTE — PROGRESS NOTES
Chief Complaint   Patient presents with   • Follow-up     3 month    • Hypertension   • Hypothyroidism   • Diabetes        Subjective     Lynn Stoner  has a past medical history of Hyperlipidemia.    Type 2 diabetes- she checks her blood pressure on a regular basis.  She states her fasting blood sugars are typically between 100-135.    Hypertension- her blood pressure at home ranges from 130-150 over 80s.  She is taking her hydrochlorothiazide and lisinopril daily.    Hyperlipidemia- she has not been taking her atorvastatin as she said it caused some nausea.    Hypothyroid- she takes her levothyroxine every morning as directed.      PHQ-2 Depression Screening  Little interest or pleasure in doing things?     Feeling down, depressed, or hopeless?     PHQ-2 Total Score     PHQ-9 Depression Screening  Little interest or pleasure in doing things?     Feeling down, depressed, or hopeless?     Trouble falling or staying asleep, or sleeping too much?     Feeling tired or having little energy?     Poor appetite or overeating?     Feeling bad about yourself - or that you are a failure or have let yourself or your family down?     Trouble concentrating on things, such as reading the newspaper or watching television?     Moving or speaking so slowly that other people could have noticed? Or the opposite - being so fidgety or restless that you have been moving around a lot more than usual?     Thoughts that you would be better off dead, or of hurting yourself in some way?     PHQ-9 Total Score     If you checked off any problems, how difficult have these problems made it for you to do your work, take care of things at home, or get along with other people?       No Known Allergies    Prior to Admission medications    Medication Sig Start Date End Date Taking? Authorizing Provider   atorvastatin (LIPITOR) 10 MG tablet Take 1 tablet by mouth Daily. 11/3/22  Yes Jerry Coto, DO   bisacodyl (Dulcolax) 5 MG EC tablet  Take as directed. 1/11/23  Yes Rodrick April, APRN   escitalopram (Lexapro) 10 MG tablet Take 1 tablet by mouth Daily for 90 days. 11/1/22 2/3/23 Yes Jerry Coto DO   glucose blood (Glucose Meter Test) test strip check blood sugar every AM fasting and record. (Dx: E11.9) 11/1/22  Yes Jerry Coto DO   hydroCHLOROthiazide (HYDRODIURIL) 25 MG tablet Take 1 tablet by mouth Daily. 11/1/22  Yes Jerry Coto DO   Lancets (freestyle) lancets  10/3/22  Yes Wilman Mensah MD   levothyroxine (SYNTHROID, LEVOTHROID) 125 MCG tablet Take 1 tablet by mouth Every Morning for 90 days. 11/1/22 2/3/23 Yes Jerry Coto DO   lisinopril (PRINIVIL,ZESTRIL) 20 MG tablet Take 1 tablet by mouth Daily for 90 days. 11/1/22 2/3/23 Yes Jerry Coto DO   metFORMIN ER (GLUCOPHAGE-XR) 500 MG 24 hr tablet Take 1 tablet by mouth 2 (Two) Times a Day With Meals for 90 days. 11/3/22 2/3/23 Yes Jerry Coto DO   metroNIDAZOLE (Flagyl) 500 MG tablet Take 1 tablet by mouth 2 (Two) Times a Day. 12/14/22  Yes Jerry Coto DO   PEG 3350 17 GM/SCOOP powder  1/11/23  Yes ProviderWilman MD   polyethylene glycol (MIRALAX) 17 g packet Take as directed.  Instructions given in office.  Dispense: 238 g bottle 1/11/23  Yes Rodrick April, APRN        Patient Active Problem List   Diagnosis   • Pap smear for cervical cancer screening   • Acquired hypothyroidism   • Diabetes mellitus, type 2 (HCC)   • Essential hypertension   • Post-menopause   • Vitamin D deficiency   • Generalized anxiety disorder   • Chronic pain of left knee   • History of colonic polyps   • Hyperlipidemia        Past Surgical History:   Procedure Laterality Date   • ENDOMETRIAL ABLATION W/ NOVASURE     • LEEP         Social History     Socioeconomic History   • Marital status:    Tobacco Use   • Smoking status: Never   • Smokeless tobacco: Never   Vaping Use   • Vaping Use: Never used   Substance  "and Sexual Activity   • Alcohol use: Never   • Drug use: Never   • Sexual activity: Defer       Family History   Problem Relation Age of Onset   • No Known Problems Mother    • No Known Problems Father    • No Known Problems Sister    • No Known Problems Brother    • No Known Problems Son    • No Known Problems Daughter    • No Known Problems Maternal Grandmother    • No Known Problems Maternal Grandfather    • No Known Problems Paternal Grandmother    • No Known Problems Paternal Grandfather    • No Known Problems Cousin    • No Known Problems Other    • Rheum arthritis Neg Hx    • Osteoarthritis Neg Hx    • Asthma Neg Hx    • Diabetes Neg Hx    • Heart failure Neg Hx    • Hyperlipidemia Neg Hx    • Hypertension Neg Hx    • Migraines Neg Hx    • Rashes / Skin problems Neg Hx    • Seizures Neg Hx    • Stroke Neg Hx    • Thyroid disease Neg Hx        Family history, surgical history, past medical history, Allergies and meds reviewed with patient today and updated in [facility] EMR.     ROS:  Review of Systems   Constitutional: Negative for fatigue.   HENT: Negative for congestion, postnasal drip and rhinorrhea.    Eyes: Negative for blurred vision and visual disturbance.   Respiratory: Negative for cough, chest tightness, shortness of breath and wheezing.    Cardiovascular: Negative for chest pain and palpitations.   Endocrine: Negative for polydipsia and polyuria.   Musculoskeletal: Positive for arthralgias.   Allergic/Immunologic: Negative for environmental allergies.   Neurological: Negative for headache.   Psychiatric/Behavioral: Negative for depressed mood. The patient is not nervous/anxious.        OBJECTIVE:  Vitals:    02/03/23 1618 02/03/23 1723   BP: (!) 181/85 158/100   BP Location: Left arm Left arm   Patient Position: Sitting Sitting   Cuff Size:  Adult   Pulse: 84    Temp: 98.4 °F (36.9 °C)    SpO2: 99%    Weight: 79.8 kg (176 lb)    Height: 162.6 cm (64\")      No results found.   Body mass index is 30.21 " kg/m².  No LMP recorded. Patient has had an ablation.    Physical Exam  Vitals and nursing note reviewed.   Constitutional:       General: She is not in acute distress.     Appearance: Normal appearance. She is obese.   HENT:      Head: Normocephalic.      Right Ear: Tympanic membrane, ear canal and external ear normal.      Left Ear: Tympanic membrane, ear canal and external ear normal.      Nose: Nose normal.      Mouth/Throat:      Mouth: Mucous membranes are moist.      Pharynx: Oropharynx is clear.   Eyes:      General: No scleral icterus.     Conjunctiva/sclera: Conjunctivae normal.      Pupils: Pupils are equal, round, and reactive to light.   Cardiovascular:      Rate and Rhythm: Normal rate and regular rhythm.      Pulses: Normal pulses.      Heart sounds: Normal heart sounds. No murmur heard.  Pulmonary:      Effort: Pulmonary effort is normal.      Breath sounds: Normal breath sounds. No wheezing, rhonchi or rales.   Musculoskeletal:      Cervical back: Neck supple. No rigidity or tenderness.   Lymphadenopathy:      Cervical: No cervical adenopathy.   Skin:     General: Skin is warm and dry.      Coloration: Skin is not jaundiced.      Findings: No rash.   Neurological:      General: No focal deficit present.      Mental Status: She is alert and oriented to person, place, and time.   Psychiatric:         Mood and Affect: Mood normal.         Thought Content: Thought content normal.         Judgment: Judgment normal.         Procedures    No visits with results within 30 Day(s) from this visit.   Latest known visit with results is:   Office Visit on 12/14/2022   Component Date Value Ref Range Status   • Diagnosis 12/14/2022 Comment   Final    NEGATIVE FOR INTRAEPITHELIAL LESION OR MALIGNANCY.   • Specimen adequacy: 12/14/2022 Comment   Final    Satisfactory for evaluation. No endocervical component is identified.   • Clinician Provided ICD-10: 12/14/2022 Comment   Final    Z01.419   • Performed by:  12/14/2022 Comment   Final    Kimberly Meier Cytotechnologist (ASCP)   • . 12/14/2022 .   Final   • Note: 12/14/2022 Comment   Final    The Pap smear is a screening test designed to aid in the detection of  premalignant and malignant conditions of the uterine cervix.  It is not a  diagnostic procedure and should not be used as the sole means of detecting  cervical cancer.  Both false-positive and false-negative reports do occur.   • Method: 12/14/2022 Comment   Final    This liquid based ThinPrep(R) pap test was screened with the  use of an image guided system.   • HPV Aptima 12/14/2022 Negative  Negative Final    This nucleic acid amplification test detects fourteen high-risk  HPV types (16,18,31,33,35,39,45,51,52,56,58,59,66,68) without  differentiation.   • GARDNERELLA VAGINALIS 12/14/2022 Positive (A)  Negative Final   • TRICHOMONAS VAGINALIS 12/14/2022 Negative  Negative Final   • BHAKTI SPECIES 12/14/2022 Negative  Negative Final       ASSESSMENT/ PLAN:    Diagnoses and all orders for this visit:    1. Essential hypertension (Primary)  Assessment & Plan:  Her blood pressure here is elevated today.  Even her home blood pressure readings are elevated at times.  We will add some amlodipine daily to her regiment.    Orders:  -     Comprehensive Metabolic Panel  -     Lipid Panel    2. Hyperlipidemia, unspecified hyperlipidemia type  Assessment & Plan:  She has not been taking her atorvastatin due to GI upset.  We will reassess her lipids and then discuss after these results.    Orders:  -     Comprehensive Metabolic Panel  -     Lipid Panel    3. Type 2 diabetes mellitus without complication, without long-term current use of insulin (HCC)  Assessment & Plan:  We will update her A1c with her labs.  She needs to continue to work on her diet and moderating her carbohydrates.    Orders:  -     Comprehensive Metabolic Panel  -     Lipid Panel  -     Hemoglobin A1c    4. Acquired hypothyroidism  Assessment & Plan:  We  will update her thyroid profile with her labs.    Orders:  -     TSH+Free T4    Other orders  -     amLODIPine (NORVASC) 5 MG tablet; Take 1 tablet by mouth Daily.  Dispense: 90 tablet; Refill: 1      Orders Placed Today:     New Medications Ordered This Visit   Medications   • amLODIPine (NORVASC) 5 MG tablet     Sig: Take 1 tablet by mouth Daily.     Dispense:  90 tablet     Refill:  1        Management Plan:     An After Visit Summary was printed and given to the patient at discharge.    Follow-up: Return in about 4 months (around 6/3/2023) for Recheck.    Jerry Coto,  2/3/2023 17:29 EST  This note was electronically signed.

## 2023-02-03 NOTE — ASSESSMENT & PLAN NOTE
She has not been taking her atorvastatin due to GI upset.  We will reassess her lipids and then discuss after these results.

## 2023-03-22 ENCOUNTER — HOSPITAL ENCOUNTER (OUTPATIENT)
Dept: MAMMOGRAPHY | Facility: HOSPITAL | Age: 58
Discharge: HOME | End: 2023-03-22
Admitting: NURSE PRACTITIONER
Payer: OTHER GOVERNMENT

## 2023-03-22 PROCEDURE — 77067 SCR MAMMO BI INCL CAD: CPT

## 2023-03-22 PROCEDURE — 77063 BREAST TOMOSYNTHESIS BI: CPT

## 2023-03-28 NOTE — PRE-PROCEDURE INSTRUCTIONS
Arrival-time of 1100.    Must have a  for transportation home post-procedure.    Patient has diet and bowel prep instructions from Dr. Marcelo's office.  No questions at this time.    Bring all home medications and inhalers to the hospital on the morning of the procedure.  Do not take any morning medications on the day of the procedure.    States she is not on any blood thinners.    Resume medications as prescribed post-procedure.     Patient verbalized understanding of instructions.    Aleisha Ma RN

## 2023-03-30 ENCOUNTER — ANESTHESIA (OUTPATIENT)
Dept: GASTROENTEROLOGY | Facility: HOSPITAL | Age: 58
End: 2023-03-30
Payer: OTHER GOVERNMENT

## 2023-03-30 ENCOUNTER — HOSPITAL ENCOUNTER (OUTPATIENT)
Facility: HOSPITAL | Age: 58
Setting detail: HOSPITAL OUTPATIENT SURGERY
Discharge: HOME | End: 2023-03-30
Attending: SURGERY | Admitting: SURGERY
Payer: OTHER GOVERNMENT

## 2023-03-30 ENCOUNTER — ANESTHESIA EVENT (OUTPATIENT)
Dept: GASTROENTEROLOGY | Facility: HOSPITAL | Age: 58
End: 2023-03-30
Payer: OTHER GOVERNMENT

## 2023-03-30 VITALS
RESPIRATION RATE: 20 BRPM | OXYGEN SATURATION: 98 % | HEART RATE: 92 BPM | DIASTOLIC BLOOD PRESSURE: 78 MMHG | SYSTOLIC BLOOD PRESSURE: 138 MMHG | WEIGHT: 175.04 LBS | TEMPERATURE: 97.9 F | BODY MASS INDEX: 30.05 KG/M2

## 2023-03-30 DIAGNOSIS — Z12.11 SCREENING FOR MALIGNANT NEOPLASM OF COLON: ICD-10-CM

## 2023-03-30 DIAGNOSIS — Z86.010 HISTORY OF COLONIC POLYPS: ICD-10-CM

## 2023-03-30 DIAGNOSIS — K63.9 COLON ABNORMALITY: Primary | ICD-10-CM

## 2023-03-30 LAB — GLUCOSE BLDC GLUCOMTR-MCNC: 118 MG/DL (ref 70–99)

## 2023-03-30 PROCEDURE — 82962 GLUCOSE BLOOD TEST: CPT

## 2023-03-30 PROCEDURE — 88305 TISSUE EXAM BY PATHOLOGIST: CPT | Performed by: SURGERY

## 2023-03-30 PROCEDURE — 25010000002 PROPOFOL 10 MG/ML EMULSION: Performed by: ANESTHESIOLOGY

## 2023-03-30 RX ORDER — SODIUM CHLORIDE, SODIUM LACTATE, POTASSIUM CHLORIDE, CALCIUM CHLORIDE 600; 310; 30; 20 MG/100ML; MG/100ML; MG/100ML; MG/100ML
30 INJECTION, SOLUTION INTRAVENOUS CONTINUOUS
Status: DISCONTINUED | OUTPATIENT
Start: 2023-03-30 | End: 2023-03-30 | Stop reason: HOSPADM

## 2023-03-30 RX ADMIN — PROPOFOL 250 MCG/KG/MIN: 10 INJECTION, EMULSION INTRAVENOUS at 12:51

## 2023-03-30 RX ADMIN — SODIUM CHLORIDE, POTASSIUM CHLORIDE, SODIUM LACTATE AND CALCIUM CHLORIDE 30 ML/HR: 600; 310; 30; 20 INJECTION, SOLUTION INTRAVENOUS at 11:09

## 2023-03-30 NOTE — ANESTHESIA POSTPROCEDURE EVALUATION
Patient: Lynn Stoner    Procedure Summary     Date: 03/30/23 Room / Location: [facility] ENDOSCOPY 3 / [facility] ENDOSCOPY    Anesthesia Start: 1248 Anesthesia Stop: 1326    Procedure: COLONOSCOPY WITH POLYPECTOMY/BIOPSY Diagnosis:       Screening for malignant neoplasm of colon      History of colonic polyps      (Screening for malignant neoplasm of colon [Z12.11])      (History of colonic polyps [Z86.010])    Surgeons: Edd Marcelo MD Provider: Mina South CRNA    Anesthesia Type: general ASA Status: 2          Anesthesia Type: general    Vitals  Vitals Value Taken Time   /84 03/30/23 1344   Temp 36.6 °C (97.9 °F) 03/30/23 1340   Pulse 90 03/30/23 1348   Resp 20 03/30/23 1350   SpO2 99 % 03/30/23 1348   Vitals shown include unvalidated device data.        Post Anesthesia Care and Evaluation    Patient location during evaluation: bedside  Patient participation: complete - patient participated  Level of consciousness: awake and alert  Pain management: adequate    Airway patency: patent  Anesthetic complications: No anesthetic complications  PONV Status: none  Cardiovascular status: acceptable  Respiratory status: acceptable  Hydration status: acceptable    Comments: An Anesthesiologist personally participated in the most demanding procedures (including induction and emergence if applicable) in the anesthesia plan, monitored the course of anesthesia administration at frequent intervals and remained physically present and available for immediate diagnosis and treatment of emergencies.

## 2023-03-30 NOTE — H&P
Colonoscopy consultation        History of Present Illness  Lynn Stoner is a 57 y.o. female presents to [facility] for colonoscopy consultation.     Patient presents today on referral from Sofia Looney for colonoscopy consultation.     She denies any abdominal pain, change in bowel habit, or rectal bleeding.     Denies any family history of colorectal cancer.        12/17: Colonoscopy (gricelda): Hepatic flexure-tubular adenoma & melanosis coli.           Objective         Medical History        Past Medical History:   Diagnosis Date   • Diabetes (HCC)     • Hyperlipidemia     • Hypertension     • Hypothyroid              Surgical History         Past Surgical History:   Procedure Laterality Date   • ENDOMETRIAL ABLATION W/ NOVASURE       • LEEP                Medications Taking          Outpatient Medications Marked as Taking for the 1/6/23 encounter (Office Visit) with Rodrick April, APRMIMI   Medication Sig Dispense Refill   • atorvastatin (LIPITOR) 10 MG tablet Take 1 tablet by mouth Daily. 90 tablet 1   • escitalopram (Lexapro) 10 MG tablet Take 1 tablet by mouth Daily for 90 days. 90 tablet 1   • glucose blood (Glucose Meter Test) test strip check blood sugar every AM fasting and record. (Dx: E11.9) 100 each 3   • hydroCHLOROthiazide (HYDRODIURIL) 25 MG tablet Take 1 tablet by mouth Daily. 90 tablet 1   • Lancets (freestyle) lancets         • levothyroxine (SYNTHROID, LEVOTHROID) 125 MCG tablet Take 1 tablet by mouth Every Morning for 90 days. 90 tablet 3   • lisinopril (PRINIVIL,ZESTRIL) 20 MG tablet Take 1 tablet by mouth Daily for 90 days. 90 tablet 3   • metFORMIN ER (GLUCOPHAGE-XR) 500 MG 24 hr tablet Take 1 tablet by mouth 2 (Two) Times a Day With Meals for 90 days. 180 tablet 1   • metroNIDAZOLE (Flagyl) 500 MG tablet Take 1 tablet by mouth 2 (Two) Times a Day. 14 tablet 0            No Known Allergies            Family History   Problem Relation Age of Onset   •  "No Known Problems Mother     • No Known Problems Father     • No Known Problems Sister     • No Known Problems Brother     • No Known Problems Son     • No Known Problems Daughter     • No Known Problems Maternal Grandmother     • No Known Problems Maternal Grandfather     • No Known Problems Paternal Grandmother     • No Known Problems Paternal Grandfather     • No Known Problems Cousin     • No Known Problems Other     • Rheum arthritis Neg Hx     • Osteoarthritis Neg Hx     • Asthma Neg Hx     • Diabetes Neg Hx     • Heart failure Neg Hx     • Hyperlipidemia Neg Hx     • Hypertension Neg Hx     • Migraines Neg Hx     • Rashes / Skin problems Neg Hx     • Seizures Neg Hx     • Stroke Neg Hx     • Thyroid disease Neg Hx           Social History   Social History           Socioeconomic History   • Marital status:    Tobacco Use   • Smoking status: Never   • Smokeless tobacco: Never   Vaping Use   • Vaping Use: Never used   Substance and Sexual Activity   • Alcohol use: Never   • Drug use: Never   • Sexual activity: Defer            Review of Systems   Constitutional: Negative for chills and fever.   Gastrointestinal: Negative for abdominal distention, abdominal pain, anal bleeding, blood in stool, constipation, diarrhea and rectal pain.         Vital Signs:   Pulse 86   Ht 162.6 cm (64\")   Wt 80.3 kg (177 lb)   BMI 30.38 kg/m²      Physical Exam  Vitals and nursing note reviewed.   Constitutional:       General: She is not in acute distress.     Appearance: Normal appearance.   HENT:      Head: Normocephalic.   Cardiovascular:      Rate and Rhythm: Normal rate.   Pulmonary:      Effort: Pulmonary effort is normal.      Breath sounds: No stridor.   Abdominal:      Palpations: Abdomen is soft.      Tenderness: There is no guarding.   Musculoskeletal:         General: Normal range of motion.   Skin:     General: Skin is warm and dry.      Coloration: Skin is not jaundiced.   Neurological:      General: No " focal deficit present.      Mental Status: She is alert and oriented to person, place, and time.   Psychiatric:         Mood and Affect: Mood normal.         Thought Content: Thought content normal.                  Result Review    :            []?  Laboratory  []?  Radiology  [x]?  Pathology  []?  Microbiology  []?  EKG/Telemetry   []?  Cardiology/Vascular   [x]?  Old records  Today I reviewed Dr. Montero's previous colonoscopy and pathology.        Assessment      Assessment and Plan    Diagnoses and all orders for this visit:     1. Screening for malignant neoplasm of colon (Primary)     2. History of colonic polyps           Follow Up   Return for Schedule colonoscopy with Dr. Marcelo on 3/30/2023 at [facility].      Phone PAT.  Hospital call with arrival time.     Possible risks/complications, benefits, and alternatives to surgical or invasive procedure have been explained to patient and/or legal guardian.     Patient has been evaluated and can tolerate anesthesia and/or sedation. Risks, benefits, and alternatives to anesthesia and sedation have been explained to patient and/or legal guardian.  Patient verbalizes understanding and is willing to proceed with above plan.      The office note was dictated with the help of the dragon dictation system.      Patient was given instructions and counseling regarding her condition or for health maintenance advice. Please see specific information pulled into the AVS if appropriate.

## 2023-04-03 LAB
CYTO UR: NORMAL
LAB AP CASE REPORT: NORMAL
LAB AP CLINICAL INFORMATION: NORMAL
PATH REPORT.FINAL DX SPEC: NORMAL
PATH REPORT.GROSS SPEC: NORMAL

## 2023-04-24 ENCOUNTER — OFFICE VISIT (OUTPATIENT)
Dept: SURGERY | Facility: CLINIC | Age: 58
End: 2023-04-24
Payer: OTHER GOVERNMENT

## 2023-04-24 VITALS — BODY MASS INDEX: 29.88 KG/M2 | HEART RATE: 101 BPM | HEIGHT: 64 IN | WEIGHT: 175 LBS

## 2023-04-24 DIAGNOSIS — Z98.890 S/P COLONOSCOPY WITH POLYPECTOMY: Primary | ICD-10-CM

## 2023-04-24 DIAGNOSIS — D36.9 TUBULAR ADENOMA: ICD-10-CM

## 2023-04-24 NOTE — PROGRESS NOTES
Chief Complaint: Post-op Follow-up    Subjective      Follow-up visit       History of Present Illness  Lynn Stoner is a 58 y.o. female presents to [facility] for follow-up visit.    Patient presents today for follow-up visit after undergoing a colonoscopy on 3/30/2023 performed by Dr. Edd Marcelo.    Patient was with a sessile tubular adenoma of the cecum per colonoscopy/pathology.  Resection and retrieval of this polyp was complete.  Patient was also with a possible extrinsic compression of the sigmoid colon, that appeared pulsatile.  We have ordered an abdominal ultrasound to view this area.    Pathology:  Clinical Information    Screening for malignant neoplasm of colon  History of colonic polyps   Final Diagnosis   Cecum polyp, biopsy:                            - Tubular adenoma      Electronically signed by Marcia Mohan DO on 4/3/2023     The colonoscopy was technically difficult and complex due to significant looping.  Successful completion of the procedure was aided by applying abdominal pressure.  Patient tolerated the procedure well.    Patient denies any postoperative complications    Objective     Past Medical History:   Diagnosis Date   • Diabetes type 2, controlled    • Hyperlipidemia    • Hypothyroidism        Past Surgical History:   Procedure Laterality Date   • COLONOSCOPY     • COLONOSCOPY N/A 3/30/2023    Procedure: COLONOSCOPY WITH POLYPECTOMY/BIOPSY;  Surgeon: Edd Marcelo MD;  Location: [facility] ENDOSCOPY;  Service: General;  Laterality: N/A;  COLON POLYP   • ENDOMETRIAL ABLATION W/ NOVASURE     • LEEP         Outpatient Medications Marked as Taking for the 4/24/23 encounter (Office Visit) with Rodrick April, APRN   Medication Sig Dispense Refill   • glucose blood (Glucose Meter Test) test strip check blood sugar every AM fasting and record. (Dx: E11.9) 100 each 3   • Lancets (freestyle) lancets          No Known Allergies     Family History  "  Problem Relation Age of Onset   • No Known Problems Mother    • No Known Problems Father    • No Known Problems Sister    • No Known Problems Brother    • No Known Problems Son    • No Known Problems Daughter    • No Known Problems Maternal Grandmother    • No Known Problems Maternal Grandfather    • No Known Problems Paternal Grandmother    • No Known Problems Paternal Grandfather    • No Known Problems Cousin    • No Known Problems Other    • Rheum arthritis Neg Hx    • Osteoarthritis Neg Hx    • Asthma Neg Hx    • Diabetes Neg Hx    • Heart failure Neg Hx    • Hyperlipidemia Neg Hx    • Hypertension Neg Hx    • Migraines Neg Hx    • Rashes / Skin problems Neg Hx    • Seizures Neg Hx    • Stroke Neg Hx    • Thyroid disease Neg Hx        Social History     Socioeconomic History   • Marital status:    Tobacco Use   • Smoking status: Never   • Smokeless tobacco: Never   Vaping Use   • Vaping Use: Never used   Substance and Sexual Activity   • Alcohol use: Never   • Drug use: Never   • Sexual activity: Defer       Review of Systems   Constitutional: Negative for chills and fever.   Gastrointestinal: Negative for abdominal distention, abdominal pain, anal bleeding, blood in stool, constipation, diarrhea and rectal pain.        Vital Signs:   Pulse 101   Ht 162.6 cm (64\")   Wt 79.4 kg (175 lb)   BMI 30.04 kg/m²      Physical Exam  Vitals and nursing note reviewed.   Constitutional:       General: She is not in acute distress.     Appearance: Normal appearance.   HENT:      Head: Normocephalic.   Cardiovascular:      Rate and Rhythm: Normal rate.   Pulmonary:      Effort: Pulmonary effort is normal.      Breath sounds: No stridor.   Abdominal:      Palpations: Abdomen is soft.      Tenderness: There is no guarding.   Musculoskeletal:         General: No deformity. Normal range of motion.   Skin:     General: Skin is warm and dry.      Coloration: Skin is not jaundiced.   Neurological:      General: No focal " deficit present.      Mental Status: She is alert and oriented to person, place, and time.   Psychiatric:         Mood and Affect: Mood normal.         Thought Content: Thought content normal.          Result Review :          []  Laboratory  []  Radiology  []  Pathology  []  Microbiology  []  EKG/Telemetry   []  Cardiology/Vascular   []  Old records  Today I reviewed Dr. Marcelo's operative and pathology report.     Assessment and Plan    Diagnoses and all orders for this visit:    1. S/P colonoscopy with polypectomy (Primary)    2. Tubular adenoma        Follow Up   Return for Rescreen colon in 5 years; follow-up in the interim as needed.     Abdominal ultrasound tomorrow.    Avoid high fat diets    Increase fiber intake    Per AGA guidelines patient will follow-up for colonoscopy surveillance as directed.    Patient was given instructions and counseling regarding her condition or for health maintenance advice. Please see specific information pulled into the AVS if appropriate.

## 2023-04-25 ENCOUNTER — HOSPITAL ENCOUNTER (OUTPATIENT)
Dept: ULTRASOUND IMAGING | Facility: HOSPITAL | Age: 58
Discharge: HOME | End: 2023-04-25
Admitting: SURGERY
Payer: OTHER GOVERNMENT

## 2023-04-25 DIAGNOSIS — K63.9 COLON ABNORMALITY: ICD-10-CM

## 2023-04-25 PROCEDURE — 76775 US EXAM ABDO BACK WALL LIM: CPT

## 2023-05-04 RX ORDER — LEVOTHYROXINE SODIUM 0.12 MG/1
125 TABLET ORAL
Qty: 90 TABLET | Refills: 3 | Status: SHIPPED | OUTPATIENT
Start: 2023-05-04 | End: 2023-08-02

## 2023-05-04 NOTE — TELEPHONE ENCOUNTER
Caller: Lynn Stoner    Relationship: Self    Best call back number: [phone]     Requested Prescriptions:   Requested Prescriptions     Pending Prescriptions Disp Refills   • levothyroxine (SYNTHROID, LEVOTHROID) 125 MCG tablet 90 tablet 3     Sig: Take 1 tablet by mouth Every Morning for 90 days.        Pharm[facility] where request should be sent: [facility]  [place] [facility] [address] [phone] [facility] [phone]      Last office visit with prescribing clinician: 2/3/2023   Last telemedicine visit with prescribing clinician: 6/5/2023   Next office visit with prescribing clinician: 6/5/2023     Additional details provided by patient: PATIENT IS NEEDING A REFILL     Does the patient have less than a 3 day supply:  [x] Yes  [] No    Would you like a call back once the refill request has been completed: [x] Yes [] No    If the office needs to give you a call back, can they leave a voicemail: [x] Yes [] No    Ras Salas Rep   05/04/23 09:45 EDT

## 2023-06-07 ENCOUNTER — TELEPHONE (OUTPATIENT)
Dept: SURGERY | Facility: CLINIC | Age: 58
End: 2023-06-07
Payer: OTHER GOVERNMENT

## 2023-06-16 ENCOUNTER — LAB (OUTPATIENT)
Dept: LAB | Facility: HOSPITAL | Age: 58
End: 2023-06-16
Payer: OTHER GOVERNMENT

## 2023-06-16 ENCOUNTER — OFFICE VISIT (OUTPATIENT)
Dept: FAMILY MEDICINE CLINIC | Facility: CLINIC | Age: 58
End: 2023-06-16
Payer: OTHER GOVERNMENT

## 2023-06-16 ENCOUNTER — HOSPITAL ENCOUNTER (OUTPATIENT)
Dept: GENERAL RADIOLOGY | Facility: HOSPITAL | Age: 58
Discharge: HOME | End: 2023-06-16
Payer: OTHER GOVERNMENT

## 2023-06-16 VITALS
WEIGHT: 176 LBS | OXYGEN SATURATION: 98 % | HEART RATE: 99 BPM | DIASTOLIC BLOOD PRESSURE: 100 MMHG | BODY MASS INDEX: 30.21 KG/M2 | SYSTOLIC BLOOD PRESSURE: 170 MMHG | TEMPERATURE: 97.4 F

## 2023-06-16 DIAGNOSIS — G89.29 CHRONIC RIGHT SHOULDER PAIN: ICD-10-CM

## 2023-06-16 DIAGNOSIS — E11.9 TYPE 2 DIABETES MELLITUS WITHOUT COMPLICATION, WITHOUT LONG-TERM CURRENT USE OF INSULIN: ICD-10-CM

## 2023-06-16 DIAGNOSIS — M25.511 CHRONIC RIGHT SHOULDER PAIN: ICD-10-CM

## 2023-06-16 DIAGNOSIS — I10 ESSENTIAL HYPERTENSION: Primary | ICD-10-CM

## 2023-06-16 DIAGNOSIS — E03.9 ACQUIRED HYPOTHYROIDISM: ICD-10-CM

## 2023-06-16 DIAGNOSIS — E78.5 HYPERLIPIDEMIA, UNSPECIFIED HYPERLIPIDEMIA TYPE: ICD-10-CM

## 2023-06-16 LAB
ALBUMIN SERPL-MCNC: 4.7 G/DL (ref 3.5–5.2)
ALBUMIN/GLOB SERPL: 1.2 G/DL
ALP SERPL-CCNC: 117 U/L (ref 39–117)
ALT SERPL W P-5'-P-CCNC: 57 U/L (ref 1–33)
ANION GAP SERPL CALCULATED.3IONS-SCNC: 13.3 MMOL/L (ref 5–15)
AST SERPL-CCNC: 33 U/L (ref 1–32)
BILIRUB SERPL-MCNC: 0.8 MG/DL (ref 0–1.2)
BUN SERPL-MCNC: 9 MG/DL (ref 6–20)
BUN/CREAT SERPL: 11.5 (ref 7–25)
CALCIUM SPEC-SCNC: 10.4 MG/DL (ref 8.6–10.5)
CHLORIDE SERPL-SCNC: 99 MMOL/L (ref 98–107)
CHOLEST SERPL-MCNC: 197 MG/DL (ref 0–200)
CO2 SERPL-SCNC: 25.7 MMOL/L (ref 22–29)
CREAT SERPL-MCNC: 0.78 MG/DL (ref 0.57–1)
EGFRCR SERPLBLD CKD-EPI 2021: 89.3 ML/MIN/1.73
GLOBULIN UR ELPH-MCNC: 3.8 GM/DL
GLUCOSE SERPL-MCNC: 101 MG/DL (ref 65–99)
HBA1C MFR BLD: 8.4 % (ref 4.8–5.6)
HDLC SERPL-MCNC: 51 MG/DL (ref 40–60)
LDLC SERPL CALC-MCNC: 135 MG/DL (ref 0–100)
LDLC/HDLC SERPL: 2.63 {RATIO}
POTASSIUM SERPL-SCNC: 4.3 MMOL/L (ref 3.5–5.2)
PROT SERPL-MCNC: 8.5 G/DL (ref 6–8.5)
SODIUM SERPL-SCNC: 138 MMOL/L (ref 136–145)
T4 FREE SERPL-MCNC: 1.84 NG/DL (ref 0.93–1.7)
TRIGL SERPL-MCNC: 59 MG/DL (ref 0–150)
TSH SERPL DL<=0.05 MIU/L-ACNC: 1.62 UIU/ML (ref 0.27–4.2)
VLDLC SERPL-MCNC: 11 MG/DL (ref 5–40)

## 2023-06-16 PROCEDURE — 73030 X-RAY EXAM OF SHOULDER: CPT

## 2023-06-16 PROCEDURE — 83036 HEMOGLOBIN GLYCOSYLATED A1C: CPT | Performed by: FAMILY MEDICINE

## 2023-06-16 PROCEDURE — 80061 LIPID PANEL: CPT | Performed by: FAMILY MEDICINE

## 2023-06-16 PROCEDURE — 80053 COMPREHEN METABOLIC PANEL: CPT | Performed by: FAMILY MEDICINE

## 2023-06-16 PROCEDURE — 84443 ASSAY THYROID STIM HORMONE: CPT | Performed by: FAMILY MEDICINE

## 2023-06-16 PROCEDURE — 84439 ASSAY OF FREE THYROXINE: CPT | Performed by: FAMILY MEDICINE

## 2023-06-16 RX ORDER — AMLODIPINE BESYLATE 5 MG/1
5 TABLET ORAL DAILY
Qty: 90 TABLET | Refills: 0 | Status: SHIPPED | OUTPATIENT
Start: 2023-06-16

## 2023-06-16 NOTE — ASSESSMENT & PLAN NOTE
She still has labs pending from our last visit for a lipid profile.  She will attempt to get those done.

## 2023-06-16 NOTE — PROGRESS NOTES
Chief Complaint   Patient presents with    Hypertension     4 month follow up        Subjective     Lynn Stoner  has a past medical history of Diabetes type 2, controlled, Hyperlipidemia, and Hypothyroidism.    Hypertension-she does check her blood pressure at home.  Typically her systolic blood pressure ranges from 138-150.  She is taking her lisinopril daily.    Hypothyroid-she takes her levothyroxine every morning as directed.      PHQ-2 Depression Screening  Little interest or pleasure in doing things?     Feeling down, depressed, or hopeless?     PHQ-2 Total Score     PHQ-9 Depression Screening  Little interest or pleasure in doing things?     Feeling down, depressed, or hopeless?     Trouble falling or staying asleep, or sleeping too much?     Feeling tired or having little energy?     Poor appetite or overeating?     Feeling bad about yourself - or that you are a failure or have let yourself or your family down?     Trouble concentrating on things, such as reading the newspaper or watching television?     Moving or speaking so slowly that other people could have noticed? Or the opposite - being so fidgety or restless that you have been moving around a lot more than usual?     Thoughts that you would be better off dead, or of hurting yourself in some way?     PHQ-9 Total Score     If you checked off any problems, how difficult have these problems made it for you to do your work, take care of things at home, or get along with other people?       No Known Allergies    Prior to Admission medications    Medication Sig Start Date End Date Taking? Authorizing Provider   glucose blood (Glucose Meter Test) test strip check blood sugar every AM fasting and record. (Dx: E11.9) 11/1/22  Yes Jerry Coto, DO   Lancets (freestyle) lancets  10/3/22  Yes ProviderWilman MD   levothyroxine (SYNTHROID, LEVOTHROID) 125 MCG tablet Take 1 tablet by mouth Every Morning for 90 days. 5/4/23 8/2/23 Yes Nnamdi  Jerry Diamond DO   lisinopril (PRINIVIL,ZESTRIL) 20 MG tablet Take 1 tablet by mouth Daily for 90 days. 11/1/22 6/16/23 Yes Jerry Coto DO        Patient Active Problem List   Diagnosis    Pap smear for cervical cancer screening    Acquired hypothyroidism    Diabetes mellitus, type 2    Essential hypertension    Post-menopause    Vitamin D deficiency    Generalized anxiety disorder    Chronic pain of left knee    History of colonic polyps    Hyperlipidemia    Chronic right shoulder pain        Past Surgical History:   Procedure Laterality Date    COLONOSCOPY      COLONOSCOPY N/A 3/30/2023    Procedure: COLONOSCOPY WITH POLYPECTOMY/BIOPSY;  Surgeon: Edd Marcelo MD;  Location: [facility] ENDOSCOPY;  Service: General;  Laterality: N/A;  COLON POLYP    ENDOMETRIAL ABLATION W/ NOVASURE      LEEP         Social History     Socioeconomic History    Marital status:    Tobacco Use    Smoking status: Never    Smokeless tobacco: Never   Vaping Use    Vaping Use: Never used   Substance and Sexual Activity    Alcohol use: Never    Drug use: Never    Sexual activity: Defer       Family History   Problem Relation Age of Onset    No Known Problems Mother     No Known Problems Father     No Known Problems Sister     No Known Problems Brother     No Known Problems Son     No Known Problems Daughter     No Known Problems Maternal Grandmother     No Known Problems Maternal Grandfather     No Known Problems Paternal Grandmother     No Known Problems Paternal Grandfather     No Known Problems Cousin     No Known Problems Other     Rheum arthritis Neg Hx     Osteoarthritis Neg Hx     Asthma Neg Hx     Diabetes Neg Hx     Heart failure Neg Hx     Hyperlipidemia Neg Hx     Hypertension Neg Hx     Migraines Neg Hx     Rashes / Skin problems Neg Hx     Seizures Neg Hx     Stroke Neg Hx     Thyroid disease Neg Hx        Family history, surgical history, past medical history, Allergies and meds reviewed with patient  today and updated in [facility] EMR.     ROS:  Review of Systems   Constitutional:  Negative for fatigue.   HENT:  Negative for congestion, postnasal drip and rhinorrhea.    Eyes:  Negative for blurred vision and visual disturbance.   Respiratory:  Negative for cough, chest tightness, shortness of breath and wheezing.    Cardiovascular:  Negative for chest pain and palpitations.   Allergic/Immunologic: Negative for environmental allergies.   Neurological:  Negative for headache.   Psychiatric/Behavioral:  Negative for depressed mood. The patient is not nervous/anxious.      OBJECTIVE:  Vitals:    06/16/23 1530 06/16/23 1532 06/16/23 1605   BP: 176/90 169/84 170/100   BP Location: Left arm  Left arm   Patient Position: Sitting  Sitting   Cuff Size: Adult  Adult   Pulse: 99     Temp: 97.4 °F (36.3 °C)     TempSrc: Temporal     SpO2: 98%     Weight: 79.8 kg (176 lb)       No results found.   Body mass index is 30.21 kg/m².  No LMP recorded (lmp unknown). Patient is postmenopausal.    Physical Exam  Vitals and nursing note reviewed.   Constitutional:       General: She is not in acute distress.     Appearance: Normal appearance. She is obese.   HENT:      Head: Normocephalic.      Right Ear: Tympanic membrane, ear canal and external ear normal.      Left Ear: Tympanic membrane, ear canal and external ear normal.      Nose: Nose normal.      Mouth/Throat:      Mouth: Mucous membranes are moist.      Pharynx: Oropharynx is clear.   Eyes:      General: No scleral icterus.     Conjunctiva/sclera: Conjunctivae normal.      Pupils: Pupils are equal, round, and reactive to light.   Cardiovascular:      Rate and Rhythm: Normal rate and regular rhythm.      Pulses: Normal pulses.      Heart sounds: Normal heart sounds. No murmur heard.  Pulmonary:      Effort: Pulmonary effort is normal.      Breath sounds: Normal breath sounds. No wheezing, rhonchi or rales.   Musculoskeletal:      Right shoulder: Tenderness present. No deformity or  crepitus. Normal range of motion (with pain). Normal strength.      Cervical back: Neck supple. No rigidity or tenderness.   Lymphadenopathy:      Cervical: No cervical adenopathy.   Skin:     General: Skin is warm and dry.      Coloration: Skin is not jaundiced.      Findings: No rash.   Neurological:      General: No focal deficit present.      Mental Status: She is alert and oriented to person, place, and time.   Psychiatric:         Mood and Affect: Mood normal.         Thought Content: Thought content normal.         Judgment: Judgment normal.       Procedures    No visits with results within 30 Day(s) from this visit.   Latest known visit with results is:   Admission on 03/30/2023, Discharged on 03/30/2023   Component Date Value Ref Range Status    Glucose 03/30/2023 118 (H)  70 - 99 mg/dL Final    Serial Number: [id number]:  [id number]    Case Report 03/30/2023    Final                    Value:Surgical Pathology Report                         Case: [id number]                                  Authorizing Provider:  Edd Marcelo MD      Collected:           03/30/2023 01:09 PM          Ordering Location:     [facility] Received:            03/31/2023 06:45 AM                                 SUITES                                                                       Pathologist:           Marcia Mohan DO                                                       Specimen:    Large Intestine, Cecum, CECUM POLYP BIOPSY                                                 Clinical Information 03/30/2023    Final                    Value:This result contains rich text formatting which cannot be displayed here.    Final Diagnosis 03/30/2023    Final                    Value:This result contains rich text formatting which cannot be displayed here.    Gross Description 03/30/2023    Final                    Value:This result contains rich text formatting which cannot be displayed here.     Microscopic Description 03/30/2023    Final    This result contains rich text formatting which cannot be displayed here.       ASSESSMENT/ PLAN:    Diagnoses and all orders for this visit:    1. Essential hypertension (Primary)  Assessment & Plan:  Her blood pressure somewhat elevated here today.  We will recheck it 1 more time.  Upon recheck her blood pressure was essentially unchanged.  We will add some amlodipine to her lisinopril daily for improved control.      2. Acquired hypothyroidism  Assessment & Plan:  Continue her current dose of levothyroxine.  She will get her pending labs from her last visit to assess her thyroid function.      3. Hyperlipidemia, unspecified hyperlipidemia type  Assessment & Plan:  She still has labs pending from our last visit for a lipid profile.  She will attempt to get those done.      4. Type 2 diabetes mellitus without complication, without long-term current use of insulin    5. Chronic right shoulder pain  Assessment & Plan:  We will get an x-ray of her right shoulder.  She may use any anti-inflammatory as needed.    Orders:  -     XR Shoulder 2+ View Right; Future    Other orders  -     amLODIPine (NORVASC) 5 MG tablet; Take 1 tablet by mouth Daily.  Dispense: 90 tablet; Refill: 0        Orders Placed Today:     New Medications Ordered This Visit   Medications    amLODIPine (NORVASC) 5 MG tablet     Sig: Take 1 tablet by mouth Daily.     Dispense:  90 tablet     Refill:  0        Management Plan:     An After Visit Summary was printed and given to the patient at discharge.    Follow-up: Return in about 2 months (around 8/16/2023) for Recheck.    Jerry Coto DO 6/16/2023 16:09 EDT  This note was electronically signed.

## 2023-06-16 NOTE — ASSESSMENT & PLAN NOTE
Continue her current dose of levothyroxine.  She will get her pending labs from her last visit to assess her thyroid function.

## 2023-06-16 NOTE — ASSESSMENT & PLAN NOTE
Her blood pressure somewhat elevated here today.  We will recheck it 1 more time.  Upon recheck her blood pressure was essentially unchanged.  We will add some amlodipine to her lisinopril daily for improved control.

## 2023-06-17 RX ORDER — ROSUVASTATIN CALCIUM 20 MG/1
20 TABLET, COATED ORAL DAILY
Qty: 90 TABLET | Refills: 1 | Status: SHIPPED | OUTPATIENT
Start: 2023-06-17

## 2023-06-17 RX ORDER — METFORMIN HYDROCHLORIDE 500 MG/1
500 TABLET, EXTENDED RELEASE ORAL
Qty: 180 TABLET | Refills: 1 | Status: SHIPPED | OUTPATIENT
Start: 2023-06-17

## 2023-06-17 RX ORDER — LEVOTHYROXINE SODIUM 112 UG/1
112 TABLET ORAL
Qty: 90 TABLET | Refills: 1 | Status: SHIPPED | OUTPATIENT
Start: 2023-06-17 | End: 2023-09-15

## 2023-08-17 ENCOUNTER — OFFICE VISIT (OUTPATIENT)
Dept: FAMILY MEDICINE CLINIC | Facility: CLINIC | Age: 58
End: 2023-08-17
Payer: OTHER GOVERNMENT

## 2023-08-17 VITALS
SYSTOLIC BLOOD PRESSURE: 144 MMHG | HEART RATE: 85 BPM | TEMPERATURE: 97.7 F | WEIGHT: 175 LBS | DIASTOLIC BLOOD PRESSURE: 98 MMHG | HEIGHT: 64 IN | OXYGEN SATURATION: 99 % | BODY MASS INDEX: 29.88 KG/M2

## 2023-08-17 DIAGNOSIS — E03.9 ACQUIRED HYPOTHYROIDISM: ICD-10-CM

## 2023-08-17 DIAGNOSIS — E78.5 HYPERLIPIDEMIA, UNSPECIFIED HYPERLIPIDEMIA TYPE: ICD-10-CM

## 2023-08-17 DIAGNOSIS — I10 ESSENTIAL HYPERTENSION: Primary | ICD-10-CM

## 2023-08-17 DIAGNOSIS — E11.9 TYPE 2 DIABETES MELLITUS WITHOUT COMPLICATION, WITHOUT LONG-TERM CURRENT USE OF INSULIN: ICD-10-CM

## 2023-08-17 RX ORDER — METFORMIN HYDROCHLORIDE 500 MG/1
1000 TABLET, EXTENDED RELEASE ORAL
Qty: 180 TABLET | Refills: 1 | Status: SHIPPED | OUTPATIENT
Start: 2023-08-17

## 2023-08-17 RX ORDER — ATORVASTATIN CALCIUM 40 MG/1
40 TABLET, FILM COATED ORAL NIGHTLY
Qty: 90 TABLET | Refills: 0 | Status: SHIPPED | OUTPATIENT
Start: 2023-08-17

## 2023-08-17 RX ORDER — LISINOPRIL 20 MG/1
20 TABLET ORAL 2 TIMES DAILY
Qty: 180 TABLET | Refills: 0 | Status: SHIPPED | OUTPATIENT
Start: 2023-08-17 | End: 2023-11-15

## 2023-08-17 NOTE — PROGRESS NOTES
Chief Complaint   Patient presents with    Follow-up     2 mo f/u- discuss new med changes     Hypertension    Hyperlipidemia    Diabetes        Subjective     Lynn Stoner  has a past medical history of Hyperlipidemia.    Type 2 diabetes-her most recent A1c 2 months ago was elevated at 8.4.  At that time added metformin.  She does check her blood sugar every morning.  She states most mornings is about 130-145.  States when she checks it later in the day it is actually gone down.    Hypertension-she does check her blood pressure at home with a wrist cuff.  Her home blood pressure readings are mildly elevated.  It is modestly elevated here today at 162/95.  She is taking her lisinopril and amlodipine daily.    Hyperlipidemia-her LDL was 135 on her last check 2 months ago.  She did take the rosuvastatin but states every time she took it she had a headache.      PHQ-2 Depression Screening  Little interest or pleasure in doing things?     Feeling down, depressed, or hopeless?     PHQ-2 Total Score     PHQ-9 Depression Screening  Little interest or pleasure in doing things?     Feeling down, depressed, or hopeless?     Trouble falling or staying asleep, or sleeping too much?     Feeling tired or having little energy?     Poor appetite or overeating?     Feeling bad about yourself - or that you are a failure or have let yourself or your family down?     Trouble concentrating on things, such as reading the newspaper or watching television?     Moving or speaking so slowly that other people could have noticed? Or the opposite - being so fidgety or restless that you have been moving around a lot more than usual?     Thoughts that you would be better off dead, or of hurting yourself in some way?     PHQ-9 Total Score     If you checked off any problems, how difficult have these problems made it for you to do your work, take care of things at home, or get along with other people?       No Known Allergies    Prior to  Admission medications    Medication Sig Start Date End Date Taking? Authorizing Provider   amLODIPine (NORVASC) 5 MG tablet Take 1 tablet by mouth Daily. 6/16/23  Yes Jerry Coto DO   glucose blood (Glucose Meter Test) test strip check blood sugar every AM fasting and record. (Dx: E11.9) 11/1/22  Yes Jerry Coto DO   Lancets (freestyle) lancets  10/3/22  Yes Provider, MD Wilman   levothyroxine (SYNTHROID, LEVOTHROID) 112 MCG tablet Take 1 tablet by mouth Every Morning for 90 days. 6/17/23 9/15/23 Yes Jerry Coto DO   metFORMIN ER (GLUCOPHAGE-XR) 500 MG 24 hr tablet Take 1 tablet by mouth Daily With Breakfast. 6/17/23  Yes Jerry Coto DO   lisinopril (PRINIVIL,ZESTRIL) 20 MG tablet Take 1 tablet by mouth Daily for 90 days. 11/1/22 6/16/23  Jerry Coto DO   rosuvastatin (Crestor) 20 MG tablet Take 1 tablet by mouth Daily.  Patient not taking: Reported on 8/17/2023 6/17/23   Jerry Coto DO        Patient Active Problem List   Diagnosis    Pap smear for cervical cancer screening    Acquired hypothyroidism    Diabetes mellitus, type 2    Essential hypertension    Post-menopause    Vitamin D deficiency    Generalized anxiety disorder    Chronic pain of left knee    History of colonic polyps    Hyperlipidemia    Chronic right shoulder pain        Past Surgical History:   Procedure Laterality Date    COLONOSCOPY      COLONOSCOPY N/A 3/30/2023    Procedure: COLONOSCOPY WITH POLYPECTOMY/BIOPSY;  Surgeon: Edd Marcelo MD;  Location: [facility] ENDOSCOPY;  Service: General;  Laterality: N/A;  COLON POLYP    ENDOMETRIAL ABLATION W/ NOVASURE      LEEP         Social History     Socioeconomic History    Marital status:    Tobacco Use    Smoking status: Never    Smokeless tobacco: Never   Vaping Use    Vaping Use: Never used   Substance and Sexual Activity    Alcohol use: Never    Drug use: Never    Sexual activity: Defer       Family  "History   Problem Relation Age of Onset    No Known Problems Mother     No Known Problems Father     No Known Problems Sister     No Known Problems Brother     No Known Problems Son     No Known Problems Daughter     No Known Problems Maternal Grandmother     No Known Problems Maternal Grandfather     No Known Problems Paternal Grandmother     No Known Problems Paternal Grandfather     No Known Problems Cousin     No Known Problems Other     Rheum arthritis Neg Hx     Osteoarthritis Neg Hx     Asthma Neg Hx     Diabetes Neg Hx     Heart failure Neg Hx     Hyperlipidemia Neg Hx     Hypertension Neg Hx     Migraines Neg Hx     Rashes / Skin problems Neg Hx     Seizures Neg Hx     Stroke Neg Hx     Thyroid disease Neg Hx        Family history, surgical history, past medical history, Allergies and meds reviewed with patient today and updated in [facility] EMR.     ROS:  Review of Systems   Constitutional:  Negative for fatigue.   Eyes:  Negative for blurred vision and visual disturbance.   Respiratory:  Negative for cough, chest tightness, shortness of breath and wheezing.    Cardiovascular:  Negative for chest pain and palpitations.   Endocrine: Negative for polydipsia and polyuria.   Allergic/Immunologic: Negative for environmental allergies.   Neurological:  Negative for headache.   Psychiatric/Behavioral:  Negative for depressed mood. The patient is not nervous/anxious.      OBJECTIVE:  Vitals:    08/17/23 1520 08/17/23 1606   BP: 162/95 144/98   BP Location: Right arm Right arm   Patient Position: Sitting Sitting   Cuff Size: Adult Adult   Pulse: 85    Temp: 97.7 øF (36.5 øC)    TempSrc: Temporal    SpO2: 99%    Weight: 79.4 kg (175 lb)    Height: 162.6 cm (64\")      No results found.   Body mass index is 30.04 kg/mý.  No LMP recorded (lmp unknown). Patient is postmenopausal.    Physical Exam  Vitals and nursing note reviewed.   Constitutional:       General: She is not in acute distress.     Appearance: Normal " appearance. She is normal weight.   HENT:      Head: Normocephalic.      Right Ear: Tympanic membrane, ear canal and external ear normal.      Left Ear: Tympanic membrane, ear canal and external ear normal.      Nose: Nose normal.      Mouth/Throat:      Mouth: Mucous membranes are moist.      Pharynx: Oropharynx is clear.   Eyes:      General: No scleral icterus.     Conjunctiva/sclera: Conjunctivae normal.      Pupils: Pupils are equal, round, and reactive to light.   Cardiovascular:      Rate and Rhythm: Normal rate and regular rhythm.      Pulses: Normal pulses.      Heart sounds: Normal heart sounds. No murmur heard.  Pulmonary:      Effort: Pulmonary effort is normal.      Breath sounds: Normal breath sounds. No wheezing, rhonchi or rales.   Musculoskeletal:      Cervical back: Neck supple. No rigidity or tenderness.   Lymphadenopathy:      Cervical: No cervical adenopathy.   Skin:     General: Skin is warm and dry.      Coloration: Skin is not jaundiced.      Findings: No rash.   Neurological:      General: No focal deficit present.      Mental Status: She is alert and oriented to person, place, and time.   Psychiatric:         Mood and Affect: Mood normal.         Thought Content: Thought content normal.         Judgment: Judgment normal.       Procedures    No visits with results within 30 Day(s) from this visit.   Latest known visit with results is:   Admission on 03/30/2023, Discharged on 03/30/2023   Component Date Value Ref Range Status    Glucose 03/30/2023 118 (H)  70 - 99 mg/dL Final    Serial Number: [id number]:  [id number]    Case Report 03/30/2023    Final                    Value:Surgical Pathology Report                         Case: [id number]                                  Authorizing Provider:  Edd Marcelo MD      Collected:           03/30/2023 01:09 PM          Ordering Location:     [facility] Received:            03/31/2023 06:45 AM                                  SUITES                                                                       Pathologist:           Marcia Mohan DO                                                       Specimen:    Large Intestine, Cecum, CECUM POLYP BIOPSY                                                 Clinical Information 03/30/2023    Final                    Value:This result contains rich text formatting which cannot be displayed here.    Final Diagnosis 03/30/2023    Final                    Value:This result contains rich text formatting which cannot be displayed here.    Gross Description 03/30/2023    Final                    Value:This result contains rich text formatting which cannot be displayed here.    Microscopic Description 03/30/2023    Final    This result contains rich text formatting which cannot be displayed here.       ASSESSMENT/ PLAN:    Diagnoses and all orders for this visit:    1. Essential hypertension (Primary)  Assessment & Plan:  Her blood pressure is a little elevated here today.  We will recheck it 1 more time.      2. Hyperlipidemia, unspecified hyperlipidemia type  Assessment & Plan:  We will switch her rosuvastatin to atorvastatin and see if she tolerates this better.      3. Acquired hypothyroidism    4. Type 2 diabetes mellitus without complication, without long-term current use of insulin    Other orders  -     atorvastatin (Lipitor) 40 MG tablet; Take 1 tablet by mouth Every Night.  Dispense: 90 tablet; Refill: 0  -     metFORMIN ER (GLUCOPHAGE-XR) 500 MG 24 hr tablet; Take 2 tablets by mouth Daily With Breakfast.  Dispense: 180 tablet; Refill: 1  -     lisinopril (PRINIVIL,ZESTRIL) 20 MG tablet; Take 1 tablet by mouth 2 (Two) Times a Day for 90 days.  Dispense: 180 tablet; Refill: 0        Orders Placed Today:     New Medications Ordered This Visit   Medications    atorvastatin (Lipitor) 40 MG tablet     Sig: Take 1 tablet by mouth Every Night.     Dispense:  90 tablet     Refill:  0     metFORMIN ER (GLUCOPHAGE-XR) 500 MG 24 hr tablet     Sig: Take 2 tablets by mouth Daily With Breakfast.     Dispense:  180 tablet     Refill:  1    lisinopril (PRINIVIL,ZESTRIL) 20 MG tablet     Sig: Take 1 tablet by mouth 2 (Two) Times a Day for 90 days.     Dispense:  180 tablet     Refill:  0        Management Plan:     An After Visit Summary was printed and given to the patient at discharge.    Follow-up: Return in about 2 months (around 10/17/2023) for Recheck.    Jerry Coto DO 8/17/2023 16:09 EDT  This note was electronically signed.

## 2023-10-13 ENCOUNTER — OFFICE VISIT (OUTPATIENT)
Dept: FAMILY MEDICINE CLINIC | Facility: CLINIC | Age: 58
End: 2023-10-13
Payer: OTHER GOVERNMENT

## 2023-10-13 VITALS
OXYGEN SATURATION: 97 % | SYSTOLIC BLOOD PRESSURE: 140 MMHG | BODY MASS INDEX: 29.71 KG/M2 | DIASTOLIC BLOOD PRESSURE: 82 MMHG | HEIGHT: 64 IN | HEART RATE: 100 BPM | WEIGHT: 174 LBS | TEMPERATURE: 97.2 F

## 2023-10-13 DIAGNOSIS — I10 ESSENTIAL HYPERTENSION: ICD-10-CM

## 2023-10-13 DIAGNOSIS — E03.9 ACQUIRED HYPOTHYROIDISM: ICD-10-CM

## 2023-10-13 DIAGNOSIS — E11.9 TYPE 2 DIABETES MELLITUS WITHOUT COMPLICATION, WITHOUT LONG-TERM CURRENT USE OF INSULIN: ICD-10-CM

## 2023-10-13 DIAGNOSIS — E78.5 HYPERLIPIDEMIA, UNSPECIFIED HYPERLIPIDEMIA TYPE: Primary | ICD-10-CM

## 2023-10-13 DIAGNOSIS — G89.29 CHRONIC RIGHT SHOULDER PAIN: ICD-10-CM

## 2023-10-13 DIAGNOSIS — M25.511 CHRONIC RIGHT SHOULDER PAIN: ICD-10-CM

## 2023-10-13 NOTE — ASSESSMENT & PLAN NOTE
Her pain is better but she still has some range of motion difficulties.  We will send her to physical therapy.  If she continues to fail to improve she will need an MRI.

## 2023-10-13 NOTE — PROGRESS NOTES
Chief Complaint   Patient presents with    Follow-up     2 month   C/O right shoulder pain     Hypertension    Hyperlipidemia    Hypothyroidism    Diabetes        Subjective     Lynn Stoner  has a past medical history of Hyperlipidemia.    Type 2 diabetes-she does check her blood sugar on a regular basis.  They have all been consistently lower than 100, typically in the 80s.    Hypertension-she does not check her blood pressure very often.  Last time she checked it it was in the 140s over 80s.    Hyperlipidemia-she has not been taking her atorvastatin every day.  She states when she had been taking it it caused some GI distress and nausea.    Hypothyroid-she takes her levothyroxine every morning as prescribed.    Shoulder pain-she states her right shoulder is not as painful as it used to be but still has difficulty raising her arm above her head.  Her x-ray showed some mild osteoarthritis.        PHQ-2 Depression Screening  Little interest or pleasure in doing things?     Feeling down, depressed, or hopeless?     PHQ-2 Total Score     PHQ-9 Depression Screening  Little interest or pleasure in doing things?     Feeling down, depressed, or hopeless?     Trouble falling or staying asleep, or sleeping too much?     Feeling tired or having little energy?     Poor appetite or overeating?     Feeling bad about yourself - or that you are a failure or have let yourself or your family down?     Trouble concentrating on things, such as reading the newspaper or watching television?     Moving or speaking so slowly that other people could have noticed? Or the opposite - being so fidgety or restless that you have been moving around a lot more than usual?     Thoughts that you would be better off dead, or of hurting yourself in some way?     PHQ-9 Total Score     If you checked off any problems, how difficult have these problems made it for you to do your work, take care of things at home, or get along with other people?        No Known Allergies    Prior to Admission medications    Medication Sig Start Date End Date Taking? Authorizing Provider   amLODIPine (NORVASC) 5 MG tablet Take 1 tablet by mouth Daily. 6/16/23  Yes Jerry Coto DO   atorvastatin (Lipitor) 40 MG tablet Take 1 tablet by mouth Every Night. 8/17/23  Yes Jerry Coto DO   glucose blood (Glucose Meter Test) test strip check blood sugar every AM fasting and record. (Dx: E11.9) 11/1/22  Yes Jerry Coto DO   Lancets (freestyle) lancets  10/3/22  Yes Provider, MD Wilman   lisinopril (PRINIVIL,ZESTRIL) 20 MG tablet Take 1 tablet by mouth 2 (Two) Times a Day for 90 days. 8/17/23 11/15/23 Yes Jerry Coto DO   metFORMIN ER (GLUCOPHAGE-XR) 500 MG 24 hr tablet Take 2 tablets by mouth Daily With Breakfast. 8/17/23  Yes Jerry Coto DO   levothyroxine (SYNTHROID, LEVOTHROID) 112 MCG tablet Take 1 tablet by mouth Every Morning for 90 days. 6/17/23 9/15/23  Jerry Coto DO        Patient Active Problem List   Diagnosis    Pap smear for cervical cancer screening    Acquired hypothyroidism    Diabetes mellitus, type 2    Essential hypertension    Post-menopause    Vitamin D deficiency    Generalized anxiety disorder    Chronic pain of left knee    History of colonic polyps    Hyperlipidemia    Chronic right shoulder pain        Past Surgical History:   Procedure Laterality Date    COLONOSCOPY      COLONOSCOPY N/A 3/30/2023    Procedure: COLONOSCOPY WITH POLYPECTOMY/BIOPSY;  Surgeon: Edd Marcelo MD;  Location: [facility] ENDOSCOPY;  Service: General;  Laterality: N/A;  COLON POLYP    ENDOMETRIAL ABLATION W/ NOVASURE      LEEP         Social History     Socioeconomic History    Marital status:    Tobacco Use    Smoking status: Never    Smokeless tobacco: Never   Vaping Use    Vaping Use: Never used   Substance and Sexual Activity    Alcohol use: Never    Drug use: Never    Sexual activity: Defer  "      Family History   Problem Relation Age of Onset    No Known Problems Mother     No Known Problems Father     No Known Problems Sister     No Known Problems Brother     No Known Problems Son     No Known Problems Daughter     No Known Problems Maternal Grandmother     No Known Problems Maternal Grandfather     No Known Problems Paternal Grandmother     No Known Problems Paternal Grandfather     No Known Problems Cousin     No Known Problems Other     Rheum arthritis Neg Hx     Osteoarthritis Neg Hx     Asthma Neg Hx     Diabetes Neg Hx     Heart failure Neg Hx     Hyperlipidemia Neg Hx     Hypertension Neg Hx     Migraines Neg Hx     Rashes / Skin problems Neg Hx     Seizures Neg Hx     Stroke Neg Hx     Thyroid disease Neg Hx        Family history, surgical history, past medical history, Allergies and meds reviewed with patient today and updated in [facility] EMR.     ROS:  Review of Systems   Constitutional:  Negative for fatigue.   HENT:  Negative for congestion, postnasal drip and rhinorrhea.    Eyes:  Negative for blurred vision and visual disturbance.   Respiratory:  Negative for cough, chest tightness, shortness of breath and wheezing.    Cardiovascular:  Negative for chest pain and palpitations.   Gastrointestinal:  Negative for constipation and diarrhea.   Endocrine: Negative for polydipsia and polyuria.   Musculoskeletal:  Positive for arthralgias.   Allergic/Immunologic: Negative for environmental allergies.   Neurological:  Negative for headache.   Psychiatric/Behavioral:  Negative for depressed mood. The patient is not nervous/anxious.        OBJECTIVE:  Vitals:    10/13/23 1549 10/13/23 1621   BP: 152/83 140/82   BP Location: Left arm Left arm   Patient Position: Sitting Sitting   Cuff Size:  Adult   Pulse: 100    Temp: 97.2 øF (36.2 øC)    SpO2: 97%    Weight: 78.9 kg (174 lb)    Height: 162.6 cm (64\")      No results found.   Body mass index is 29.87 kg/mý.  No LMP recorded (lmp unknown). Patient is " postmenopausal.    Physical Exam  Vitals and nursing note reviewed.   Constitutional:       General: She is not in acute distress.     Appearance: Normal appearance. She is normal weight.   HENT:      Head: Normocephalic.      Right Ear: Tympanic membrane, ear canal and external ear normal.      Left Ear: Tympanic membrane, ear canal and external ear normal.      Nose: Nose normal.      Mouth/Throat:      Mouth: Mucous membranes are moist.      Pharynx: Oropharynx is clear.   Eyes:      General: No scleral icterus.     Conjunctiva/sclera: Conjunctivae normal.      Pupils: Pupils are equal, round, and reactive to light.   Cardiovascular:      Rate and Rhythm: Normal rate and regular rhythm.      Pulses: Normal pulses.      Heart sounds: Normal heart sounds. No murmur heard.  Pulmonary:      Effort: Pulmonary effort is normal.      Breath sounds: Normal breath sounds. No wheezing, rhonchi or rales.   Musculoskeletal:      Cervical back: Neck supple. No rigidity or tenderness.   Lymphadenopathy:      Cervical: No cervical adenopathy.   Skin:     General: Skin is warm and dry.      Coloration: Skin is not jaundiced.      Findings: No rash.   Neurological:      General: No focal deficit present.      Mental Status: She is alert and oriented to person, place, and time.   Psychiatric:         Mood and Affect: Mood normal.         Thought Content: Thought content normal.         Judgment: Judgment normal.         Procedures    No visits with results within 30 Day(s) from this visit.   Latest known visit with results is:   Admission on 03/30/2023, Discharged on 03/30/2023   Component Date Value Ref Range Status    Glucose 03/30/2023 118 (H)  70 - 99 mg/dL Final    Serial Number: [id number]:  [id number]    Case Report 03/30/2023    Final                    Value:Surgical Pathology Report                         Case: [id number]                                  Authorizing Provider:  Edd Marcelo MD      Collected:            03/30/2023 01:09 PM          Ordering Location:     [facility] Received:            03/31/2023 06:45 AM                                 SUITES                                                                       Pathologist:           Marcia Mohan DO                                                       Specimen:    Large Intestine, Cecum, CECUM POLYP BIOPSY                                                 Clinical Information 03/30/2023    Final                    Value:This result contains rich text formatting which cannot be displayed here.    Final Diagnosis 03/30/2023    Final                    Value:This result contains rich text formatting which cannot be displayed here.    Gross Description 03/30/2023    Final                    Value:This result contains rich text formatting which cannot be displayed here.    Microscopic Description 03/30/2023    Final    This result contains rich text formatting which cannot be displayed here.       ASSESSMENT/ PLAN:    Diagnoses and all orders for this visit:    1. Hyperlipidemia, unspecified hyperlipidemia type (Primary)  Assessment & Plan:  We will update her lipid profile with her labs.    Orders:  -     Comprehensive Metabolic Panel  -     Lipid Panel    2. Essential hypertension  Assessment & Plan:  Her initial blood pressure somewhat elevated here today.  We will recheck it 1 more time.  Upon recheck her blood pressure is better but still little too high.  She was only taking her lisinopril once a day.  She will start taking the lisinopril 20 mg twice daily along with the amlodipine 5 mg once a day.    Orders:  -     Comprehensive Metabolic Panel  -     Lipid Panel    3. Type 2 diabetes mellitus without complication, without long-term current use of insulin  Assessment & Plan:  Her home blood sugar readings sound excellent.  We will update her A1c with her labs.    Orders:  -     Comprehensive Metabolic Panel  -     Lipid Panel  -      Hemoglobin A1c    4. Acquired hypothyroidism  Assessment & Plan:  We will update her thyroid profile with her labs here today.    Orders:  -     TSH+Free T4    5. Chronic right shoulder pain  Assessment & Plan:  Her pain is better but she still has some range of motion difficulties.  We will send her to physical therapy.  If she continues to fail to improve she will need an MRI.    Orders:  -     Ambulatory Referral to Physical Therapy Evaluate and treat               Orders Placed Today:     No orders of the defined types were placed in this encounter.       Management Plan:     An After Visit Summary was printed and given to the patient at discharge.    Follow-up: No follow-ups on file.    Jerry Coto DO 10/13/2023 16:24 EDT  This note was electronically signed.

## 2023-10-16 ENCOUNTER — PATIENT ROUNDING (BHMG ONLY) (OUTPATIENT)
Dept: FAMILY MEDICINE CLINIC | Facility: CLINIC | Age: 58
End: 2023-10-16
Payer: OTHER GOVERNMENT

## 2023-10-16 NOTE — PROGRESS NOTES
A My-Chart message has been sent to the patient for PATIENT R[place]ING with [facility]

## 2023-11-15 ENCOUNTER — TREATMENT (OUTPATIENT)
Dept: PHYSICAL THERAPY | Facility: CLINIC | Age: 58
End: 2023-11-15
Payer: OTHER GOVERNMENT

## 2023-11-15 DIAGNOSIS — G89.29 CHRONIC RIGHT SHOULDER PAIN: Primary | ICD-10-CM

## 2023-11-15 DIAGNOSIS — M25.511 CHRONIC RIGHT SHOULDER PAIN: Primary | ICD-10-CM

## 2023-11-15 DIAGNOSIS — R29.898 WEAKNESS OF SHOULDER: ICD-10-CM

## 2023-11-15 DIAGNOSIS — M75.41 SHOULDER IMPINGEMENT SYNDROME, RIGHT: ICD-10-CM

## 2023-11-15 PROCEDURE — 97110 THERAPEUTIC EXERCISES: CPT | Performed by: PHYSICAL THERAPIST

## 2023-11-15 PROCEDURE — 97161 PT EVAL LOW COMPLEX 20 MIN: CPT | Performed by: PHYSICAL THERAPIST

## 2023-11-15 PROCEDURE — 97530 THERAPEUTIC ACTIVITIES: CPT | Performed by: PHYSICAL THERAPIST

## 2023-11-15 NOTE — PROGRESS NOTES
Physical Therapy Initial Evaluation and Plan of Care  [address] [facility], [address] [place] [address]        Patient: Lynn Stoner   : 1965  Diagnosis/ICD-10 Code:  Chronic right shoulder pain [M25.511, G89.29]  Referring practitioner: Jerry Coto,*  Date of Initial Visit: 11/15/2023  Today's Date: 11/15/2023  Patient seen for 1 sessions         R shoulder x-ray results:  Right shoulder series demonstrating mild degenerative change consistent with osteoarthritis.     Subjective Questionnaire: UEFS: 55/80      Subjective Evaluation    History of Present Illness  Mechanism of injury: Pt reports R shoulder pain that started several years ago.  Pt reports that she did PT and crossfit which decreased pain.  Pt states that pain worsened about a year ago after stopping crossfit.  Pt reports that pain is localized in joint and goes to mid humerus.  Pt denies radicular sympotoms.  Pt reports that overhead reaching, lifting and reaching behind her back increases her pain.  Pt describes pain as sharp and catching.  Pt reports that she would like to go back to cross fit.  Pt reports that she is not taking pain medication.  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    Pain  Current pain rating: 3  At best pain ratin  At worst pain ratin  Quality: sharp, tight, discomfort and pulling  Aggravating factors: overhead activity, lifting, prolonged positioning, outstretched reach, repetitive movement and movement  Progression: worsening    Social Support  Lives with: spouse    Patient Goals  Patient goals for therapy: decreased pain, increased motion, increased strength and return to sport/leisure activities         Objective          Static Posture     Head  Forward.    Shoulders  Elevated and rounded.    Scapulae  Left protracted and right protracted.    Lumbar Spine   Increased lordosis.     Tenderness     Right Shoulder  Tenderness in the AC joint, acromion, biceps tendon (proximal) and  bicipital groove.     Active Range of Motion   Left Shoulder   Flexion: WFL  Abduction: WFL  External rotation 0°: 80 degrees   Internal rotation BTB: T10     Right Shoulder   Flexion: 110 degrees with pain  Abduction: 75 degrees with pain  External rotation 0°: 25 degrees with pain  Internal rotation BTB: sacrum with pain    Passive Range of Motion     Right Shoulder   Flexion: 156 degrees with pain  Abduction: 121 degrees with pain  External rotation 90°: 32 degrees with pain  Internal rotation 90°: 21 degrees with pain    Additional Passive Range of Motion Details  R shoulder PROM limited by muscle guarding and joint stiffness    Strength/Myotome Testing     Left Shoulder     Planes of Motion   Flexion: 5   Extension: 5   Abduction: 5   External rotation at 0°: 5   Internal rotation at 0°: 5     Right Shoulder     Planes of Motion   Flexion: 4-   Extension: 4   Abduction: 4- (within AROM)   External rotation at 0°: 4-   Internal rotation at 0°: 4     Left Elbow   Flexion: 5  Extension: 5    Right Elbow   Flexion: 5  Extension: 5    Tests     Right Shoulder   Positive Hawkin's, Neer's and painful arc.   Negative drop arm, empty can and full can.      General Comments     Shoulder Comments   Light touch sensation normal in bilateral upper extremities  Tightness noted in bilateral upper trapezius, levator scapulae, latissimus dorsi and pec minor, worse on R than L           Assessment & Plan       Assessment  Impairments: abnormal or restricted ROM, activity intolerance, impaired physical strength, lacks appropriate home exercise program and pain with function   Functional limitations: carrying objects, lifting, sleeping, pulling, pushing, uncomfortable because of pain, reaching behind back, reaching overhead and unable to perform repetitive tasks   Assessment details: Patient presents with signs/symptoms consistent with L shoulder impingements syndrome including decreased R shoulder ROM, R shoulder and bilateral  scapular weakness, poor posture, positive Hawkin's Ariel/Neer's testing and reports of pain limiting function during ADLs as shown on the UEFS.  Patient would benefit from skilled PT services in order to address deficits limiting function at this time.  HEP was given to patient this session and education on HEP/diagnosis provided to patient.       Goals  Plan Goals: 1. The patient has limited ROM of the R shoulder.    LTG 1: 12 weeks:  The patient will demonstrate 150 degrees of R shoulder flexion, 150 degrees of shoulder abduction, 70 degrees of shoulder external rotation, and  shoulder internal rotation to T10 to allow the patient to reach into upper kitchen cabinets and manipulate clothing behind the back with greater ease.    STATUS:  New   STG 1a: 6 weeks:  The patient will demonstrate 130 degrees of R shoulder flexion, 120 degrees of shoulder abduction, 45 degrees of shoulder external rotation, and shoulder internal rotation to L3.    STATUS:  New    2. The patient has limited strength of the R shoulder.   LTG 2: 12 weeks:  The patient will demonstrate 4+ /5 strength for R shoulder flexion, abduction, external rotation, and internal rotation in order to demonstrate improved shoulder stability.    STATUS:  New   STG 2a: 6 weeks:  The patient will demonstrate 4 /5 strength for R shoulder flexion, abduction, external rotation.    STATUS:  New   STG2b:  4 weeks:  The patient will be independent with home exercises.     STATUS:  New     3. The patient complains of pain to the R shoulder.   LTG 3: 12 weeks:  The patient will report a pain rating of 1 /10 or better in order to improve sleep quality and tolerance to performance of activities of daily living.    STATUS:  New   STG 3a: 6 weeks:  The patient will report a pain rating of 3 /10 or better.     STATUS:  New    4. Carrying, Moving, and Handling Objects Functional Limitation     LTG 4: 12 weeks:  The patient will demonstrate 10 % limitation by achieving a  score of 72 on the UEFS.    STATUS:  New             STG 4: 12 weeks:  The patient will demonstrate 19 % limitation by achieving a score of 65 on the UEFS.    STATUS:  New         Plan  Therapy options: will be seen for skilled therapy services  Planned modality interventions: cryotherapy, TENS and thermotherapy (hydrocollator packs)  Planned therapy interventions: manual therapy, ADL retraining, neuromuscular re-education, body mechanics training, postural training, soft tissue mobilization, flexibility, spinal/joint mobilization, functional ROM exercises, strengthening, stretching, home exercise program, therapeutic activities, IADL retraining and joint mobilization  Frequency: 2x week (1-2)  Duration in weeks: 12  Treatment plan discussed with: patient        Timed:         Manual Therapy:    4     mins  55053;     Therapeutic Exercise:    11     mins  44698;     Neuromuscular Carla:    0    mins  02757;    Therapeutic Activity:     8     mins  12966;     Gait Trainin     mins  40195;     Ultrasound:     0     mins  35386;    Ionto                               0    mins   30001  Self-care  __0__ mins 44397    Un-Timed:  Electrical Stimulation:    0     mins  95255 ( );  Traction     0     mins 54421  Low Eval     20     Mins  05623  Mod Eval     0     Mins  79098  High Eval                       0     Mins  37720  Hot pack     0     Mins    Cold pack                       0     Mins      Timed Treatment:   23   mins   Total Treatment:     43   mins    PT SIGNATURE: Shauna Paredes PT      Electronically signed 11/15/2023  [place] License: [id number]    Initial Certification    Certification Period: 11/15/2023 thru 2024  NPI: [id number]  I certify that the therapy services are furnished while this patient is under my care.  The services outlined above are required by this patient, and will be reviewed every 90 days.     PHYSICIAN: Jerry Coto,       DATE:     Please sign and return via fax  to [phone].. Thank you, [facility].

## 2023-11-21 NOTE — PROGRESS NOTES
"Well Woman Visit    CC: Scheduled annual well gyn visit  Chief Complaint   Patient presents with    Gynecologic Exam           HPI:   58 y.o.No obstetric history on file. who presents today for annual exam. Patient is postmenopausal and had uterine ablation. She is sexually active with . She denies any H/O STDS.  She denies any pain with intercourse. She denies any family H/O breast, uterine or ovarian cancer. She denies any vaginal odor, vaginal discharge, dysuria/hematuria, F/C, D/C, N/V, CP or SOB. She denies any difficulties with urination or incontinence issues.    History: PMHx, Meds, Allergies, PSHx, Social Hx, and POBHx all reviewed and updated.    ROS:  General ROS: negative for chills or fatigue  Ophthalmic ROS: negative for blurry vision or decreased vision  ENT ROS: negative for epistaxis, headaches or hearing change  Hematological and Lymphatic ROS: negative for bleeding problems or blood clots  Endocrine ROS: negative for breast changes or galactorrhea  Breast ROS: negative for galactorrhea or new or changing breast lumps  Respiratory ROS: negative for cough or hemoptysis  Cardiovascular ROS: negative for chest pain or dyspnea on exertion  Gastrointestinal ROS: negative for abdominal pain or appetite loss  Genito-Urinary ROS: negative for difficulty in urination or vaginal irritation   Musculoskeletal ROS: negative for gait disturbance or joint pain  Neurological ROS: negative for behavioral changes or bowel and bladder control changes  Dermatological ROS: negative for rash  Psych ROS: negative for depression      PHYSICAL EXAM:      /90   Pulse 95   Temp 96.9 °F (36.1 °C) (Temporal)   Ht 162.6 cm (64.02\")   Wt 79.1 kg (174 lb 6.4 oz)   LMP  (LMP Unknown)   SpO2 98%   BMI 29.92 kg/m²  Not found.    General- NAD, alert and oriented, appropriate  Psych- Normal mood, good memory  Neck- No masses, no thyroid enlargement  CV- Regular rhythm, no murnurs  Resp- CTA to bases, no " wheezes  Abdomen- Soft, non distended, non tender, no masses    Breast Exam:  Breast self awareness counseled  Breast left-  Bilaterally symmetrical, no masses, non tender, no nipple discharge  Breast right- Bilaterally symmetrical, no masses, non tender, no nipple discharge    Pelvic Exam:   External genitalia- Normal female, no lesions  Urethra/meatus- Normal, no masses, non tender  Bladder- Normal, no masses, non tender  Vagina- Normal, no atrophy, no lesions, no discharge.  Prolapse : none noted, not examined with split speculum to delineate  Cvx- Normal, no lesions, no discharge, No cervical motion tenderness  Uterus- Normal size, shape & consistency.  Non tender, mobile, & no prolapse  Adnexa- No mass, non tender  Anus/Rectum/Perineum- Normal appearance, no mass, good sphincter tone, no hemorrhoids, no prolapse    Lymphatic- No palpable neck, axillary, or groin nodes  Ext- No edema, no cyanosis    Skin- No lesions, no rashes, no acanthosis nigricans      ASSESSMENT and PLAN:    Diagnoses and all orders for this visit:    1. Encounter for routine gynecological examination with Papanicolaou smear of cervix (Primary)  -     Gardnerella vaginalis, Trichomonas vaginalis, Candida albicans, DNA - Swab, Vagina  -     IgP, Aptima HPV    2. Visit for screening mammogram  -     Mammo Screening Digital Tomosynthesis Bilateral With CAD; Future    3. Need for hepatitis C screening test  -     Hepatitis C Antibody; Future  -     Hepatitis C Antibody    4. Type 2 diabetes mellitus without complication, without long-term current use of insulin  -     Microalbumin / Creatinine Urine Ratio - Urine, Clean Catch      Obtain labs as previously ordered.     Preventative:  1. Annuals every year; Cytology collections per prevailing guidelines.   2. Mammograms begin every year at 39 yo if no abnormalities are found and no family history.  3. Bone density studies begin at 64 yo. If no fracture history or osteoporosis family history.  4.  Colonoscopy begins at 44 yo. Repeat every ten years if negative and no family history.  5. Calcium of 4079-6246 mg/day in split dosing  6. Vitamin D 400-800 IU/day      She understands the importance of having any ordered tests to be performed in a timely fashion.  The risks of not performing them include, but are not limited to, advanced cancer stages, bone loss from osteoporosis and/or subsequent increase in morbidity and/or mortality.  She is encouraged to review her results online and/or contact or office if she has questions.     Follow Up:  Return for Pending results.        AIDEN Anderson

## 2023-11-22 ENCOUNTER — TREATMENT (OUTPATIENT)
Dept: PHYSICAL THERAPY | Facility: CLINIC | Age: 58
End: 2023-11-22
Payer: OTHER GOVERNMENT

## 2023-11-22 DIAGNOSIS — G89.29 CHRONIC RIGHT SHOULDER PAIN: Primary | ICD-10-CM

## 2023-11-22 DIAGNOSIS — R29.898 WEAKNESS OF SHOULDER: ICD-10-CM

## 2023-11-22 DIAGNOSIS — M25.511 CHRONIC RIGHT SHOULDER PAIN: Primary | ICD-10-CM

## 2023-11-22 DIAGNOSIS — M75.41 SHOULDER IMPINGEMENT SYNDROME, RIGHT: ICD-10-CM

## 2023-11-22 NOTE — PROGRESS NOTES
Physical Therapy Daily Treatment Note  [address] [facility], [address], [place] [address]      Patient: Lynn Stoner   : 1965  Diagnosis/ICD-10 Code:  Chronic right shoulder pain [M25.511, G89.29]  Referring practitioner: Jerry Coto,*  Date of Initial Visit: Type: THERAPY  Noted: 11/15/2023  Today's Date: 2023  Patient seen for 2 sessions             Subjective   Lynn Stoner reports: 5/10 R shoulder pain today.  Pt reports that in general pain has improved.    Objective   See Exercise, Manual, and Modality Logs for complete treatment.       Assessment/Plan  Patient tolerated all exercise progressions and manual therapy well today but continues to demonstrate R shoulder strength/ROM deficits limiting function. Pt continues to demonstrate poor scapular activation and significant compensation strategies during exercises which increases with fatigue.  Continue to progress per patient tolerance.    Progress per Plan of Care           Timed:  Manual Therapy:    8     mins  79314;  Therapeutic Exercise:    22     mins  52062;     Neuromuscular Carla:    0    mins  96332;    Therapeutic Activity:     8     mins  84695;     Gait Trainin     mins  22436;     Ultrasound:     0     mins  05372;    Electrical Stimulation:    0     mins  29306;  Iontophoresis     0     mins  09146    Untimed:  Electrical Stimulation:    0     mins  75219 ( );  Mechanical Traction:    0     mins  05127;   Fluidotherapy     0     mins  26595  Hot pack     0     Mins    Cold pack                       0     Mins     Timed Treatment:   38   mins   Total Treatment:     38   mins        Shauna Paredes PT    Electronically signed [unfilled]  [place] License: [id number]  NPI number: [id number]

## 2023-12-01 ENCOUNTER — TREATMENT (OUTPATIENT)
Dept: PHYSICAL THERAPY | Facility: CLINIC | Age: 58
End: 2023-12-01
Payer: OTHER GOVERNMENT

## 2023-12-01 DIAGNOSIS — R29.898 WEAKNESS OF SHOULDER: ICD-10-CM

## 2023-12-01 DIAGNOSIS — M25.511 CHRONIC RIGHT SHOULDER PAIN: Primary | ICD-10-CM

## 2023-12-01 DIAGNOSIS — M75.41 SHOULDER IMPINGEMENT SYNDROME, RIGHT: ICD-10-CM

## 2023-12-01 DIAGNOSIS — G89.29 CHRONIC RIGHT SHOULDER PAIN: Primary | ICD-10-CM

## 2023-12-01 NOTE — PROGRESS NOTES
Physical Therapy Daily Treatment Note  [address] [facility], [address], [place] [address]      Patient: Lynn Stoner   : 1965  Diagnosis/ICD-10 Code:  Chronic right shoulder pain [M25.511, G89.29]  Referring practitioner: Jerry Coto,*  Date of Initial Visit: Type: THERAPY  Noted: 11/15/2023  Today's Date: 2023  Patient seen for 3 sessions             Subjective   Lynn Stoner reports: no pain at beginning of session today.    Objective   See Exercise, Manual, and Modality Logs for complete treatment.       Assessment/Plan  Patient tolerated all exercise progressions and manual therapy well today but continues to demonstrate R shoulder strength/ROM deficits limiting function. Pt continues to demonstrate poor scapular activation and significant compensation strategies during exercises which increases with fatigue.  Pt continues to require cues to prevent compensation.  Continue to progress per patient tolerance.   Progress per Plan of Care           Timed:  Manual Therapy:    8     mins  98890;  Therapeutic Exercise:    22     mins  57520;     Neuromuscular Carla:    0    mins  84187;    Therapeutic Activity:     8     mins  31217;     Gait Trainin     mins  01404;     Ultrasound:     0     mins  48694;    Electrical Stimulation:    0     mins  70484;  Iontophoresis     0     mins  24207    Untimed:  Electrical Stimulation:    0     mins  72085 ( );  Mechanical Traction:    0     mins  18441;   Fluidotherapy     0     mins  70990  Hot pack     0     Mins    Cold pack                       0     Mins     Timed Treatment:   38   mins   Total Treatment:     40   mins        Shauna Paredes PT    Electronically signed [unfilled]  [place] License: [id number]  NPI number: [id number]

## 2023-12-12 ENCOUNTER — TREATMENT (OUTPATIENT)
Dept: PHYSICAL THERAPY | Facility: CLINIC | Age: 58
End: 2023-12-12
Payer: OTHER GOVERNMENT

## 2023-12-12 DIAGNOSIS — M75.41 SHOULDER IMPINGEMENT SYNDROME, RIGHT: ICD-10-CM

## 2023-12-12 DIAGNOSIS — R29.898 WEAKNESS OF SHOULDER: ICD-10-CM

## 2023-12-12 DIAGNOSIS — M25.511 CHRONIC RIGHT SHOULDER PAIN: Primary | ICD-10-CM

## 2023-12-12 DIAGNOSIS — G89.29 CHRONIC RIGHT SHOULDER PAIN: Primary | ICD-10-CM

## 2023-12-12 NOTE — PROGRESS NOTES
Physical Therapy Daily Treatment Note  [address] [facility], [address], [place] [address]      Patient: Lynn Stoner   : 1965  Diagnosis/ICD-10 Code:  Chronic right shoulder pain [M25.511, G89.29]  Referring practitioner: Jerry Coto,*  Date of Initial Visit: Type: THERAPY  Noted: 11/15/2023  Today's Date: 2023  Patient seen for 4 sessions             Subjective   Lynn Stoner reports: no pain today.  Pt reports improved overall mobility and pain the past week and is now able to reach behind her without pain.     Objective   See Exercise, Manual, and Modality Logs for complete treatment.       Assessment/Plan  Patient tolerated all exercise progressions and manual therapy well today.  Pt continues to require cues to avoid compensation strategies but this is improved compared to last session. Complete progress note next session.    Progress per Plan of Care           Timed:  Manual Therapy:    8     mins  90685;  Therapeutic Exercise:    22     mins  43757;     Neuromuscular Carla:    0    mins  96265;    Therapeutic Activity:     8     mins  06419;     Gait Trainin     mins  67588;     Ultrasound:     0     mins  72134;    Electrical Stimulation:    0     mins  55242;  Iontophoresis     0     mins  38479    Untimed:  Electrical Stimulation:    0     mins  24368 ( );  Mechanical Traction:    0     mins  78201;   Fluidotherapy     0     mins  13768  Hot pack     0     Mins    Cold pack                       0     Mins     Timed Treatment:   38   mins   Total Treatment:     38   mins        Shauna Paredes PT    Electronically signed [unfilled]  [place] License: [id number]  NPI number: [id number]

## 2023-12-18 ENCOUNTER — OFFICE VISIT (OUTPATIENT)
Dept: FAMILY MEDICINE CLINIC | Facility: CLINIC | Age: 58
End: 2023-12-18
Payer: OTHER GOVERNMENT

## 2023-12-18 VITALS
WEIGHT: 174.4 LBS | BODY MASS INDEX: 29.78 KG/M2 | DIASTOLIC BLOOD PRESSURE: 90 MMHG | TEMPERATURE: 96.9 F | HEIGHT: 64 IN | SYSTOLIC BLOOD PRESSURE: 149 MMHG | OXYGEN SATURATION: 98 % | HEART RATE: 95 BPM

## 2023-12-18 DIAGNOSIS — Z12.31 VISIT FOR SCREENING MAMMOGRAM: ICD-10-CM

## 2023-12-18 DIAGNOSIS — Z01.419 ENCOUNTER FOR ROUTINE GYNECOLOGICAL EXAMINATION WITH PAPANICOLAOU SMEAR OF CERVIX: Primary | ICD-10-CM

## 2023-12-18 DIAGNOSIS — E11.9 TYPE 2 DIABETES MELLITUS WITHOUT COMPLICATION, WITHOUT LONG-TERM CURRENT USE OF INSULIN: ICD-10-CM

## 2023-12-18 DIAGNOSIS — Z11.59 NEED FOR HEPATITIS C SCREENING TEST: ICD-10-CM

## 2023-12-18 LAB
ALBUMIN SERPL-MCNC: 4.3 G/DL (ref 3.5–5.2)
ALBUMIN/GLOB SERPL: 1 G/DL
ALP SERPL-CCNC: 111 U/L (ref 39–117)
ALT SERPL W P-5'-P-CCNC: 53 U/L (ref 1–33)
ANION GAP SERPL CALCULATED.3IONS-SCNC: 13.1 MMOL/L (ref 5–15)
AST SERPL-CCNC: 37 U/L (ref 1–32)
BILIRUB SERPL-MCNC: 0.6 MG/DL (ref 0–1.2)
BUN SERPL-MCNC: 7 MG/DL (ref 6–20)
BUN/CREAT SERPL: 8.1 (ref 7–25)
CALCIUM SPEC-SCNC: 9.7 MG/DL (ref 8.6–10.5)
CANDIDA SPECIES: NEGATIVE
CHLORIDE SERPL-SCNC: 97 MMOL/L (ref 98–107)
CHOLEST SERPL-MCNC: 210 MG/DL (ref 0–200)
CO2 SERPL-SCNC: 25.9 MMOL/L (ref 22–29)
CREAT SERPL-MCNC: 0.86 MG/DL (ref 0.57–1)
EGFRCR SERPLBLD CKD-EPI 2021: 79.4 ML/MIN/1.73
GARDNERELLA VAGINALIS: POSITIVE
GLOBULIN UR ELPH-MCNC: 4.4 GM/DL
GLUCOSE SERPL-MCNC: 160 MG/DL (ref 65–99)
HBA1C MFR BLD: 8.2 % (ref 4.8–5.6)
HCV AB SER DONR QL: NORMAL
HDLC SERPL-MCNC: 37 MG/DL (ref 40–60)
LDLC SERPL CALC-MCNC: 157 MG/DL (ref 0–100)
LDLC/HDLC SERPL: 4.19 {RATIO}
POTASSIUM SERPL-SCNC: 3.5 MMOL/L (ref 3.5–5.2)
PROT SERPL-MCNC: 8.7 G/DL (ref 6–8.5)
SODIUM SERPL-SCNC: 136 MMOL/L (ref 136–145)
T VAGINALIS DNA VAG QL PROBE+SIG AMP: NEGATIVE
T4 FREE SERPL-MCNC: 1.33 NG/DL (ref 0.93–1.7)
TRIGL SERPL-MCNC: 90 MG/DL (ref 0–150)
TSH SERPL DL<=0.05 MIU/L-ACNC: 7.19 UIU/ML (ref 0.27–4.2)
VLDLC SERPL-MCNC: 16 MG/DL (ref 5–40)

## 2023-12-18 PROCEDURE — G0123 SCREEN CERV/VAG THIN LAYER: HCPCS | Performed by: NURSE PRACTITIONER

## 2023-12-18 PROCEDURE — 84443 ASSAY THYROID STIM HORMONE: CPT | Performed by: FAMILY MEDICINE

## 2023-12-18 PROCEDURE — 87510 GARDNER VAG DNA DIR PROBE: CPT | Performed by: NURSE PRACTITIONER

## 2023-12-18 PROCEDURE — 87480 CANDIDA DNA DIR PROBE: CPT | Performed by: NURSE PRACTITIONER

## 2023-12-18 PROCEDURE — 84439 ASSAY OF FREE THYROXINE: CPT | Performed by: FAMILY MEDICINE

## 2023-12-18 PROCEDURE — 80061 LIPID PANEL: CPT | Performed by: FAMILY MEDICINE

## 2023-12-18 PROCEDURE — 87624 HPV HI-RISK TYP POOLED RSLT: CPT | Performed by: NURSE PRACTITIONER

## 2023-12-18 PROCEDURE — 83036 HEMOGLOBIN GLYCOSYLATED A1C: CPT | Performed by: FAMILY MEDICINE

## 2023-12-18 PROCEDURE — 87660 TRICHOMONAS VAGIN DIR PROBE: CPT | Performed by: NURSE PRACTITIONER

## 2023-12-18 PROCEDURE — 80053 COMPREHEN METABOLIC PANEL: CPT | Performed by: FAMILY MEDICINE

## 2023-12-18 PROCEDURE — 86803 HEPATITIS C AB TEST: CPT | Performed by: NURSE PRACTITIONER

## 2023-12-19 RX ORDER — SEMAGLUTIDE 1.34 MG/ML
INJECTION, SOLUTION SUBCUTANEOUS
Qty: 4.5 ML | Refills: 0 | Status: SHIPPED | OUTPATIENT
Start: 2023-12-19

## 2023-12-19 RX ORDER — ROSUVASTATIN CALCIUM 40 MG/1
40 TABLET, COATED ORAL DAILY
Qty: 90 TABLET | Refills: 1 | Status: SHIPPED | OUTPATIENT
Start: 2023-12-19

## 2023-12-19 RX ORDER — METRONIDAZOLE 500 MG/1
500 TABLET ORAL 2 TIMES DAILY
Qty: 14 TABLET | Refills: 0 | Status: SHIPPED | OUTPATIENT
Start: 2023-12-19

## 2023-12-20 ENCOUNTER — TELEPHONE (OUTPATIENT)
Dept: FAMILY MEDICINE CLINIC | Facility: CLINIC | Age: 58
End: 2023-12-20

## 2023-12-20 NOTE — TELEPHONE ENCOUNTER
Caller: Lynn Stoner    Relationship to patient: Self    Best call back number: [phone]     Patient is needing: PATIENT WAS PRESCRIBED OZEMPIC. PATIENT WENT TO [facility] TO  MEDICATION. PHARMACY ADVISED PATIENT THAT SHE NEEDS A PRIOR AUTHORIZATION. PLEASE ADVISE.

## 2023-12-22 ENCOUNTER — TELEPHONE (OUTPATIENT)
Dept: FAMILY MEDICINE CLINIC | Facility: CLINIC | Age: 58
End: 2023-12-22
Payer: OTHER GOVERNMENT

## 2023-12-22 NOTE — TELEPHONE ENCOUNTER
Pt states the ozempic is being ordered but she would like to know if you still want her to take the metformin . Please advise

## 2023-12-29 ENCOUNTER — TREATMENT (OUTPATIENT)
Dept: PHYSICAL THERAPY | Facility: CLINIC | Age: 58
End: 2023-12-29
Payer: OTHER GOVERNMENT

## 2023-12-29 DIAGNOSIS — G89.29 CHRONIC RIGHT SHOULDER PAIN: Primary | ICD-10-CM

## 2023-12-29 DIAGNOSIS — M75.41 SHOULDER IMPINGEMENT SYNDROME, RIGHT: ICD-10-CM

## 2023-12-29 DIAGNOSIS — R29.898 WEAKNESS OF SHOULDER: ICD-10-CM

## 2023-12-29 DIAGNOSIS — M25.511 CHRONIC RIGHT SHOULDER PAIN: Primary | ICD-10-CM

## 2023-12-29 PROCEDURE — 97530 THERAPEUTIC ACTIVITIES: CPT | Performed by: PHYSICAL THERAPIST

## 2023-12-29 PROCEDURE — 97110 THERAPEUTIC EXERCISES: CPT | Performed by: PHYSICAL THERAPIST

## 2023-12-29 NOTE — PROGRESS NOTES
Progress note  [address] [facility], [address] [place] [address]      Patient: Lynn Stoner   : 1965  Diagnosis/ICD-10 Code:  Chronic right shoulder pain [M25.511, G89.29]  Referring practitioner: Jerry Coto,*  Date of Initial Visit: Type: THERAPY  Noted: 11/15/2023  Today's Date: 2023  Patient seen for 5 sessions        Subjective:   Lynn Stoner reports: no pain today.  Pt states that she has had no pain or difficulty during ADLs using the R UE.  Due to this pt reports that she is comfortable with D/C from PT today.    Subjective Questionnaire: UEFS: 68/80      Subjective   Objective   Active Range of Motion     Right Shoulder   Flexion: 130 degrees   Abduction: 155 degrees   External rotation 0°: 58 degrees   Internal rotation BTB: sacrum        Strength/Myotome Testing      Left Shoulder      Planes of Motion   Flexion: 5   Extension: 5   Abduction: 5   External rotation at 0°: 5   Internal rotation at 0°: 5      Right Shoulder      Planes of Motion (within available ROM)  Flexion: 4+  Extension: 5   Abduction: 4+   External rotation at 0°: 4+   Internal rotation at 0°: 5     Assessment & Plan       Assessment  Assessment details: Pt demonstrates significant improvements in R shoulder flexion, abduction and external rotation AROM, R shoulder strength compared to initial evaluation.  Pt reports no pain or difficulty completing ADLs.  Pt continues to be most limited in IR movements and was given IR stretches for HEP today.  Due to pain/functional improvements the pt reports that she is comfortable with D/C at this time.  Pt is to continue HEP and will be discharged today.    Goals  Plan Goals:  1. The patient has limited ROM of the R shoulder.                      LTG 1: 12 weeks:  The patient will demonstrate 150 degrees of R shoulder flexion, 150 degrees of shoulder abduction, 70 degrees of shoulder external rotation, and  shoulder internal rotation to T10 to allow the patient to reach  into upper kitchen cabinets and manipulate clothing behind the back with greater ease.                          STATUS:  partially met              STG 1a: 6 weeks:  The patient will demonstrate 130 degrees of R shoulder flexion, 120 degrees of shoulder abduction, 45 degrees of shoulder external rotation, and shoulder internal rotation to L3.                          STATUS:  met     2. The patient has limited strength of the R shoulder.              LTG 2: 12 weeks:  The patient will demonstrate 4+ /5 strength for R shoulder flexion, abduction, external rotation, and internal rotation in order to demonstrate improved shoulder stability.                          STATUS:  met              STG 2a: 6 weeks:  The patient will demonstrate 4 /5 strength for R shoulder flexion, abduction, external rotation.                          STATUS:  met              STG2b:  4 weeks:  The patient will be independent with home exercises.                           STATUS:  met                3. The patient complains of pain to the R shoulder.              LTG 3: 12 weeks:  The patient will report a pain rating of 1 /10 or better in order to improve sleep quality and tolerance to performance of activities of daily living.                          STATUS:  met              STG 3a: 6 weeks:  The patient will report a pain rating of 3 /10 or better.                           STATUS:  met     4. Carrying, Moving, and Handling Objects Functional Limitation                               LTG 4: 12 weeks:  The patient will demonstrate 10 % limitation by achieving a score of 72 on the UEFS.                          STATUS:  not met, progressed significantly towards             STG 4: 12 weeks:  The patient will demonstrate 19 % limitation by achieving a score of 65 on the UEFS.                          STATUS:  met        Progress toward previous goals: Partially Met      Recommendations: Discharge    PT SIGNATURE: Shauna Paredes,  PT     Electronically signed 2023  DATE TREATMENT INITIATED: 2023  [place] License: [id number]      Based upon review of the patient's progress and continued therapy plan, it is my medical opinion that Lynn Stoner should continue physical therapy treatment at [facility]  [address] [place] [id number]  [phone].      Timed:  Manual Therapy:    6     mins  41115;  Therapeutic Exercise:    24     mins  44172;     Neuromuscular Carla:    0    mins  85969;    Therapeutic Activity:     8     mins  43574;     Gait Trainin     mins  01935;     Ultrasound:     0     mins  59021;    Electrical Stimulation:    0     mins  59900 ( );    Untimed:  Electrical Stimulation:    0     mins  94151 ( );  Mechanical Traction:    0     mins  97863;     Timed Treatment:   38   mins   Total Treatment:     43   mins

## 2024-01-22 ENCOUNTER — OFFICE VISIT (OUTPATIENT)
Dept: FAMILY MEDICINE CLINIC | Facility: CLINIC | Age: 59
End: 2024-01-22
Payer: OTHER GOVERNMENT

## 2024-01-22 VITALS
SYSTOLIC BLOOD PRESSURE: 142 MMHG | HEART RATE: 84 BPM | DIASTOLIC BLOOD PRESSURE: 90 MMHG | BODY MASS INDEX: 29.02 KG/M2 | HEIGHT: 64 IN | TEMPERATURE: 97 F | OXYGEN SATURATION: 99 % | WEIGHT: 170 LBS

## 2024-01-22 DIAGNOSIS — I10 ESSENTIAL HYPERTENSION: ICD-10-CM

## 2024-01-22 DIAGNOSIS — E11.9 TYPE 2 DIABETES MELLITUS WITHOUT COMPLICATION, WITHOUT LONG-TERM CURRENT USE OF INSULIN: ICD-10-CM

## 2024-01-22 DIAGNOSIS — E78.00 PURE HYPERCHOLESTEROLEMIA: Primary | ICD-10-CM

## 2024-01-22 DIAGNOSIS — E03.9 ACQUIRED HYPOTHYROIDISM: ICD-10-CM

## 2024-01-22 PROCEDURE — 99214 OFFICE O/P EST MOD 30 MIN: CPT | Performed by: FAMILY MEDICINE

## 2024-01-22 RX ORDER — BLOOD SUGAR DIAGNOSTIC
STRIP MISCELLANEOUS
Qty: 100 EACH | Refills: 3 | Status: SHIPPED | OUTPATIENT
Start: 2024-01-22

## 2024-01-22 RX ORDER — AMLODIPINE BESYLATE 10 MG/1
10 TABLET ORAL DAILY
Qty: 90 TABLET | Refills: 1 | Status: SHIPPED | OUTPATIENT
Start: 2024-01-22

## 2024-01-22 RX ORDER — LEVOTHYROXINE SODIUM 112 MCG
112 TABLET ORAL DAILY
Qty: 90 TABLET | Refills: 1 | Status: SHIPPED | OUTPATIENT
Start: 2024-01-22

## 2024-01-22 NOTE — ASSESSMENT & PLAN NOTE
Her LDL over the last 3 years is actually gone up.  Will continue her rosuvastatin 40 mg daily.  She will continue to work on lifestyle changes.

## 2024-01-22 NOTE — ASSESSMENT & PLAN NOTE
Her thyroid profiles have been chaotic.  They have been up and down.  This may be due to the variations in different generic products.  Will switch her to Synthroid and d.a.w. it for consistency.  She does take it correctly every morning as directed.

## 2024-01-22 NOTE — ASSESSMENT & PLAN NOTE
Since switching to the Ozempic her blood sugars have been better.  Has also helped her with her lifestyle changes.  Will will wait and recheck her A1c at her next visit   Azithromycin Pregnancy And Lactation Text: This medication is considered safe during pregnancy and is also secreted in breast milk.

## 2024-01-22 NOTE — ASSESSMENT & PLAN NOTE
Her blood pressure is a little elevated here today.  Recheck it 1 more time.  Upon recheck her blood pressure is improved although still mildly elevated.  Will hold off of changing at this time and just continue to monitor.

## 2024-01-22 NOTE — PROGRESS NOTES
Chief Complaint   Patient presents with    Follow-up     3m     Hyperlipidemia    Hypertension    Thyroid Problem    Back Pain        Subjective     Lynn Stoner  has a past medical history of Hyperlipidemia.    Hyperlipidemia  This is a recurrent problem. The current episode started more than 1 year ago. The problem is controlled. Recent lipid tests were reviewed and are normal. Exacerbating diseases include diabetes. She has no history of hypothyroidism. Pertinent negatives include no chest pain or shortness of breath. Current antihyperlipidemic treatment includes statins. The current treatment provides significant improvement of lipids. There are no compliance problems.  Risk factors for coronary artery disease include diabetes mellitus, dyslipidemia and hypertension.   Hypertension  This is a recurrent problem. The current episode started more than 1 year ago. The problem is unchanged. The problem is uncontrolled. Pertinent negatives include no blurred vision, chest pain, palpitations or shortness of breath. Risk factors for coronary artery disease include diabetes mellitus, dyslipidemia and post-menopausal state. Current antihypertension treatment includes ACE inhibitors and calcium channel blockers. The current treatment provides mild improvement. There are no compliance problems.  There is no history of CVA. Identifiable causes of hypertension include a thyroid problem.   Thyroid Problem  Presents for follow-up visit. Symptoms include constipation and weight loss. Patient reports no anxiety, depressed mood, dry skin, fatigue, palpitations or visual change. The symptoms have been worsening. Her past medical history is significant for diabetes and hyperlipidemia.   Back Pain  Associated symptoms include weight loss. Pertinent negatives include no chest pain.   Diabetes  She presents for her follow-up diabetic visit. She has type 2 diabetes mellitus. Her disease course has been improving. Pertinent negatives  for hypoglycemia include no nervousness/anxiousness. Associated symptoms include weight loss. Pertinent negatives for diabetes include no blurred vision, no chest pain, no fatigue, no polydipsia, no polyuria and no visual change. Symptoms are improving. Pertinent negatives for diabetic complications include no CVA, heart disease, nephropathy or peripheral neuropathy. Risk factors for coronary artery disease include diabetes mellitus, dyslipidemia and hypertension. Current diabetic treatments: Ozempic. Her weight is decreasing steadily. She is following a diabetic diet. Meal planning includes carbohydrate counting. Her breakfast blood glucose range is generally 110-130 mg/dl.       PHQ-2 Depression Screening  Little interest or pleasure in doing things?     Feeling down, depressed, or hopeless?     PHQ-2 Total Score     PHQ-9 Depression Screening  Little interest or pleasure in doing things?     Feeling down, depressed, or hopeless?     Trouble falling or staying asleep, or sleeping too much?     Feeling tired or having little energy?     Poor appetite or overeating?     Feeling bad about yourself - or that you are a failure or have let yourself or your family down?     Trouble concentrating on things, such as reading the newspaper or watching television?     Moving or speaking so slowly that other people could have noticed? Or the opposite - being so fidgety or restless that you have been moving around a lot more than usual?     Thoughts that you would be better off dead, or of hurting yourself in some way?     PHQ-9 Total Score     If you checked off any problems, how difficult have these problems made it for you to do your work, take care of things at home, or get along with other people?       No Known Allergies    Prior to Admission medications    Medication Sig Start Date End Date Taking? Authorizing Provider   amLODIPine (NORVASC) 5 MG tablet Take 1 tablet by mouth Daily. 6/16/23  Yes Jerry Coto,  DO   glucose blood (Glucose Meter Test) test strip check blood sugar every AM fasting and record. (Dx: E11.9) 11/1/22  Yes Jerry Coto DO   Lancets (freestyle) lancets  10/3/22  Yes Provider, MD Wilman   levothyroxine (SYNTHROID, LEVOTHROID) 112 MCG tablet Take 1 tablet by mouth Every Morning for 90 days. 6/17/23 1/22/24 Yes Jerry Coto DO   lisinopril (PRINIVIL,ZESTRIL) 20 MG tablet Take 1 tablet by mouth 2 (Two) Times a Day for 90 days. 8/17/23 1/22/24 Yes Jerry Coto DO   rosuvastatin (CRESTOR) 40 MG tablet Take 1 tablet by mouth Daily. 12/19/23  Yes Jerry Coto DO   Semaglutide,0.25 or 0.5MG/DOS, (Ozempic, 0.25 or 0.5 MG/DOSE,) 2 MG/1.5ML solution pen-injector Inject 0.25 mg subcutaneously once weekly x 4 weeks.  Then increase to 0.5 mg subcu weekly. 12/19/23  Yes Jerry Coto DO   metFORMIN ER (GLUCOPHAGE-XR) 500 MG 24 hr tablet Take 2 tablets by mouth Daily With Breakfast.  Patient not taking: Reported on 1/22/2024 8/17/23 1/22/24  Jerry Coto DO   metroNIDAZOLE (Flagyl) 500 MG tablet Take 1 tablet by mouth 2 (Two) Times a Day.  Patient not taking: Reported on 1/22/2024 12/19/23 1/22/24  Sofia Tucker APRN        Patient Active Problem List   Diagnosis    Pap smear for cervical cancer screening    Acquired hypothyroidism    Diabetes mellitus, type 2    Essential hypertension    Post-menopause    Vitamin D deficiency    Generalized anxiety disorder    Chronic pain of left knee    History of colonic polyps    Hyperlipidemia    Chronic right shoulder pain        Past Surgical History:   Procedure Laterality Date    COLONOSCOPY      COLONOSCOPY N/A 3/30/2023    Procedure: COLONOSCOPY WITH POLYPECTOMY/BIOPSY;  Surgeon: Edd Marcelo MD;  Location: [facility] ENDOSCOPY;  Service: General;  Laterality: N/A;  COLON POLYP    ENDOMETRIAL ABLATION W/ NOVASURE      LEEP         Social History     Socioeconomic History    Marital status:     Tobacco Use    Smoking status: Never    Smokeless tobacco: Never   Vaping Use    Vaping Use: Never used   Substance and Sexual Activity    Alcohol use: Never    Drug use: Never    Sexual activity: Defer       Family History   Problem Relation Age of Onset    No Known Problems Mother     No Known Problems Father     No Known Problems Sister     No Known Problems Brother     No Known Problems Son     No Known Problems Daughter     No Known Problems Maternal Grandmother     No Known Problems Maternal Grandfather     No Known Problems Paternal Grandmother     No Known Problems Paternal Grandfather     No Known Problems Cousin     No Known Problems Other     Rheum arthritis Neg Hx     Osteoarthritis Neg Hx     Asthma Neg Hx     Diabetes Neg Hx     Heart failure Neg Hx     Hyperlipidemia Neg Hx     Hypertension Neg Hx     Migraines Neg Hx     Rashes / Skin problems Neg Hx     Seizures Neg Hx     Stroke Neg Hx     Thyroid disease Neg Hx        Family history, surgical history, past medical history, Allergies and meds reviewed with patient today and updated in [employer] EMR.     ROS:  Review of Systems   Constitutional:  Positive for unexpected weight loss. Negative for fatigue.   HENT:  Negative for congestion, postnasal drip and rhinorrhea.    Eyes:  Negative for blurred vision and visual disturbance.   Respiratory:  Positive for cough. Negative for chest tightness, shortness of breath and wheezing.    Cardiovascular:  Negative for chest pain and palpitations.   Gastrointestinal:  Positive for constipation.   Endocrine: Negative for polydipsia and polyuria.   Musculoskeletal:  Positive for back pain.   Skin:  Negative for dry skin.   Neurological:  Negative for headache.   Psychiatric/Behavioral:  Negative for depressed mood. The patient is not nervous/anxious.        OBJECTIVE:  Vitals:    01/22/24 1556 01/22/24 1623   BP: 154/87 142/90   BP Location: Left arm Left arm   Patient Position: Sitting Sitting   Cuff  "Size: Adult Adult   Pulse: 84    Temp: 97 °F (36.1 °C)    TempSrc: Temporal    SpO2: 99%    Weight: 77.1 kg (170 lb)    Height: 162.6 cm (64.02\")      No results found.   Body mass index is 29.16 kg/m².  No LMP recorded (lmp unknown). Patient is postmenopausal.    Physical Exam  Vitals and nursing note reviewed.   Constitutional:       General: She is not in acute distress.     Appearance: Normal appearance. She is normal weight.   HENT:      Head: Normocephalic.      Right Ear: Tympanic membrane, ear canal and external ear normal.      Left Ear: Tympanic membrane, ear canal and external ear normal.      Nose: Nose normal.      Mouth/Throat:      Mouth: Mucous membranes are moist.      Pharynx: Oropharynx is clear.   Eyes:      General: No scleral icterus.     Conjunctiva/sclera: Conjunctivae normal.      Pupils: Pupils are equal, round, and reactive to light.   Cardiovascular:      Rate and Rhythm: Normal rate and regular rhythm.      Pulses: Normal pulses.      Heart sounds: Normal heart sounds. No murmur heard.  Pulmonary:      Effort: Pulmonary effort is normal.      Breath sounds: Normal breath sounds. No wheezing, rhonchi or rales.   Musculoskeletal:      Cervical back: Neck supple. No rigidity or tenderness.   Lymphadenopathy:      Cervical: No cervical adenopathy.   Skin:     General: Skin is warm and dry.      Coloration: Skin is not jaundiced.      Findings: No rash.   Neurological:      General: No focal deficit present.      Mental Status: She is alert and oriented to person, place, and time.   Psychiatric:         Mood and Affect: Mood normal.         Thought Content: Thought content normal.         Judgment: Judgment normal.         Procedures    No visits with results within 30 Day(s) from this visit.   Latest known visit with results is:   Office Visit on 12/18/2023   Component Date Value Ref Range Status    GARDNERELLA VAGINALIS 12/18/2023 Positive (A)  Negative Final    TRICHOMONAS VAGINALIS " 12/18/2023 Negative  Negative Final    BHAKTI SPECIES 12/18/2023 Negative  Negative Final    Diagnosis 12/18/2023 Comment   Final    NEGATIVE FOR INTRAEPITHELIAL LESION OR MALIGNANCY.    Specimen adequacy: 12/18/2023 Comment   Final    Satisfactory for evaluation. No endocervical component is identified.    Clinician Provided ICD-10: 12/18/2023 Comment   Final    Z01.419    Performed by: 12/18/2023 Comment   Final    Jil Kim, Cytotechnologist (ASCP)    . 12/18/2023 .   Final    Note: 12/18/2023 Comment   Final    The Pap smear is a screening test designed to aid in the detection of  premalignant and malignant conditions of the uterine cervix.  It is not a  diagnostic procedure and should not be used as the sole means of detecting  cervical cancer.  Both false-positive and false-negative reports do occur.    Method: 12/18/2023 TNP   Final    The Thin Prep(R) Imager was unable to read this specimen.  Therefore  a manual review was performed.    HPV Aptima 12/18/2023 Negative  Negative Final    This nucleic acid amplification test detects fourteen high-risk  HPV types (16,18,31,33,35,39,45,51,52,56,58,59,66,68) without  differentiation.    Hepatitis C Ab 12/18/2023 Non-Reactive  Non-Reactive Final       ASSESSMENT/ PLAN:    Diagnoses and all orders for this visit:    1. Pure hypercholesterolemia (Primary)  Assessment & Plan:  Her LDL over the last 3 years is actually gone up.  Will continue her rosuvastatin 40 mg daily.  She will continue to work on lifestyle changes.      2. Essential hypertension  Assessment & Plan:  Her blood pressure is a little elevated here today.  Recheck it 1 more time.  Upon recheck her blood pressure is improved although still mildly elevated.  Will hold off of changing at this time and just continue to monitor.      3. Type 2 diabetes mellitus without complication, without long-term current use of insulin  Assessment & Plan:  Since switching to the Ozempic her blood sugars have  been better.  Has also helped her with her lifestyle changes.  Will will wait and recheck her A1c at her next visit    Orders:  -     Comprehensive Metabolic Panel; Future  -     Lipid Panel; Future  -     Hemoglobin A1c; Future    4. Acquired hypothyroidism  Assessment & Plan:  Her thyroid profiles have been chaotic.  They have been up and down.  This may be due to the variations in different generic products.  Will switch her to Synthroid and d.a.w. it for consistency.  She does take it correctly every morning as directed.    Orders:  -     TSH+Free T4; Future    Other orders  -     amLODIPine (NORVASC) 10 MG tablet; Take 1 tablet by mouth Daily.  Dispense: 90 tablet; Refill: 1  -     Synthroid 112 MCG tablet; Take 1 tablet by mouth Daily.  Dispense: 90 tablet; Refill: 1  -     glucose blood (Glucose Meter Test) test strip; check blood sugar every AM fasting and record. (Dx: E11.9)  Dispense: 100 each; Refill: 3               Orders Placed Today:     New Medications Ordered This Visit   Medications    amLODIPine (NORVASC) 10 MG tablet     Sig: Take 1 tablet by mouth Daily.     Dispense:  90 tablet     Refill:  1    Synthroid 112 MCG tablet     Sig: Take 1 tablet by mouth Daily.     Dispense:  90 tablet     Refill:  1    glucose blood (Glucose Meter Test) test strip     Sig: check blood sugar every AM fasting and record. (Dx: E11.9)     Dispense:  100 each     Refill:  3        Management Plan:     An After Visit Summary was printed and given to the patient at discharge.    Follow-up: Return in about 3 months (around 4/22/2024).    Jerry Coto,  1/22/2024 16:28 EST  This note was electronically signed.

## 2024-02-07 ENCOUNTER — TELEPHONE (OUTPATIENT)
Dept: FAMILY MEDICINE CLINIC | Facility: CLINIC | Age: 59
End: 2024-02-07
Payer: OTHER GOVERNMENT

## 2024-02-07 RX ORDER — SEMAGLUTIDE 1.34 MG/ML
0.5 INJECTION, SOLUTION SUBCUTANEOUS WEEKLY
Qty: 4.5 ML | Refills: 5 | Status: SHIPPED | OUTPATIENT
Start: 2024-02-07

## 2024-02-07 NOTE — TELEPHONE ENCOUNTER
Caller: Lynn Stoner    Relationship: Self    Best call back number: [phone]     Requested Prescriptions:   Requested Prescriptions     Pending Prescriptions Disp Refills    Semaglutide,0.25 or 0.5MG/DOS, (Ozempic, 0.25 or 0.5 MG/DOSE,) 2 MG/1.5ML solution pen-injector 4.5 mL 0     Sig: Inject 0.25 mg subcutaneously once weekly x 4 weeks.  Then increase to 0.5 mg subcu weekly.        Pharmacy where request should be sent: [place] [phone] [place] [phone]      Last office visit with prescribing clinician: 1/22/2024   Last telemedicine visit with prescribing clinician: Visit date not found   Next office visit with prescribing clinician: 4/23/2024       Does the patient have less than a 3 day supply:  [x] Yes  [] No    Would you like a call back once the refill request has been completed: [] Yes [x] No    If the office needs to give you a call back, can they leave a voicemail: [] Yes [x] No    Angeline Yo, PCT   02/07/24 09:31 EST

## 2024-03-25 ENCOUNTER — HOSPITAL ENCOUNTER (OUTPATIENT)
Dept: MAMMOGRAPHY | Facility: HOSPITAL | Age: 59
Discharge: HOME | End: 2024-03-25
Admitting: NURSE PRACTITIONER
Payer: OTHER GOVERNMENT

## 2024-03-25 DIAGNOSIS — Z12.31 VISIT FOR SCREENING MAMMOGRAM: ICD-10-CM

## 2024-03-25 PROCEDURE — 77067 SCR MAMMO BI INCL CAD: CPT

## 2024-03-25 PROCEDURE — 77063 BREAST TOMOSYNTHESIS BI: CPT

## 2024-04-17 ENCOUNTER — LAB (OUTPATIENT)
Dept: LAB | Facility: HOSPITAL | Age: 59
End: 2024-04-17
Payer: OTHER GOVERNMENT

## 2024-04-17 DIAGNOSIS — E11.9 TYPE 2 DIABETES MELLITUS WITHOUT COMPLICATION, WITHOUT LONG-TERM CURRENT USE OF INSULIN: ICD-10-CM

## 2024-04-17 DIAGNOSIS — E03.9 ACQUIRED HYPOTHYROIDISM: ICD-10-CM

## 2024-04-17 LAB
ALBUMIN SERPL-MCNC: 4.5 G/DL (ref 3.5–5.2)
ALBUMIN/GLOB SERPL: 1.2 G/DL
ALP SERPL-CCNC: 83 U/L (ref 39–117)
ALT SERPL W P-5'-P-CCNC: 19 U/L (ref 1–33)
ANION GAP SERPL CALCULATED.3IONS-SCNC: 9 MMOL/L (ref 5–15)
AST SERPL-CCNC: 13 U/L (ref 1–32)
BILIRUB SERPL-MCNC: 0.5 MG/DL (ref 0–1.2)
BUN SERPL-MCNC: 10 MG/DL (ref 6–20)
BUN/CREAT SERPL: 10.4 (ref 7–25)
CALCIUM SPEC-SCNC: 9.2 MG/DL (ref 8.6–10.5)
CHLORIDE SERPL-SCNC: 102 MMOL/L (ref 98–107)
CHOLEST SERPL-MCNC: 204 MG/DL (ref 0–200)
CO2 SERPL-SCNC: 27 MMOL/L (ref 22–29)
CREAT SERPL-MCNC: 0.96 MG/DL (ref 0.57–1)
EGFRCR SERPLBLD CKD-EPI 2021: 69.2 ML/MIN/1.73
GLOBULIN UR ELPH-MCNC: 3.7 GM/DL
GLUCOSE SERPL-MCNC: 135 MG/DL (ref 65–99)
HBA1C MFR BLD: 6.2 % (ref 4.8–5.6)
HDLC SERPL-MCNC: 51 MG/DL (ref 40–60)
LDLC SERPL CALC-MCNC: 142 MG/DL (ref 0–100)
LDLC/HDLC SERPL: 2.76 {RATIO}
POTASSIUM SERPL-SCNC: 4.1 MMOL/L (ref 3.5–5.2)
PROT SERPL-MCNC: 8.2 G/DL (ref 6–8.5)
SODIUM SERPL-SCNC: 138 MMOL/L (ref 136–145)
T4 FREE SERPL-MCNC: 1.63 NG/DL (ref 0.93–1.7)
TRIGL SERPL-MCNC: 60 MG/DL (ref 0–150)
TSH SERPL DL<=0.05 MIU/L-ACNC: 2.95 UIU/ML (ref 0.27–4.2)
VLDLC SERPL-MCNC: 11 MG/DL (ref 5–40)

## 2024-04-17 PROCEDURE — 84439 ASSAY OF FREE THYROXINE: CPT

## 2024-04-17 PROCEDURE — 84443 ASSAY THYROID STIM HORMONE: CPT

## 2024-04-17 PROCEDURE — 80061 LIPID PANEL: CPT

## 2024-04-17 PROCEDURE — 83036 HEMOGLOBIN GLYCOSYLATED A1C: CPT

## 2024-04-17 PROCEDURE — 80053 COMPREHEN METABOLIC PANEL: CPT

## 2024-04-18 RX ORDER — EZETIMIBE 10 MG/1
10 TABLET ORAL DAILY
Qty: 90 TABLET | Refills: 1 | Status: SHIPPED | OUTPATIENT
Start: 2024-04-18

## 2024-04-23 ENCOUNTER — OFFICE VISIT (OUTPATIENT)
Dept: FAMILY MEDICINE CLINIC | Facility: CLINIC | Age: 59
End: 2024-04-23
Payer: OTHER GOVERNMENT

## 2024-04-23 VITALS
HEIGHT: 64 IN | SYSTOLIC BLOOD PRESSURE: 145 MMHG | DIASTOLIC BLOOD PRESSURE: 81 MMHG | OXYGEN SATURATION: 98 % | TEMPERATURE: 98 F | WEIGHT: 167 LBS | HEART RATE: 82 BPM | BODY MASS INDEX: 28.51 KG/M2

## 2024-04-23 DIAGNOSIS — E78.00 PURE HYPERCHOLESTEROLEMIA: Primary | ICD-10-CM

## 2024-04-23 DIAGNOSIS — I10 ESSENTIAL HYPERTENSION: ICD-10-CM

## 2024-04-23 DIAGNOSIS — E11.9 TYPE 2 DIABETES MELLITUS WITHOUT COMPLICATION, WITHOUT LONG-TERM CURRENT USE OF INSULIN: ICD-10-CM

## 2024-04-23 PROCEDURE — 99214 OFFICE O/P EST MOD 30 MIN: CPT | Performed by: FAMILY MEDICINE

## 2024-04-23 NOTE — ASSESSMENT & PLAN NOTE
Her diabetes is much improved.  She has had a 2% drop in her A1c.  She will continue her current medication

## 2024-04-23 NOTE — ASSESSMENT & PLAN NOTE
Her recent thyroid profile was normal.  She is uncertain whether they are giving her generic levothyroxine or branded Synthroid.  Her prescription was labeled as d.a.w.  She will check with her pharmacy to clarify this issue.

## 2024-04-23 NOTE — ASSESSMENT & PLAN NOTE
With her significantly elevated cholesterol so she really needs to be on a potent statin.  She still needs at least a 50% reduction at this time.  I encouraged her to retry the rosuvastatin 1 more time.

## 2024-04-23 NOTE — PROGRESS NOTES
Chief Complaint   Patient presents with    Follow-up     3 mo f/u    Hyperlipidemia    Hypertension    Medication Problem     Patient is confused on dosage of thyroid med        Subjective     Lynn Stoner  has a past medical history of Hyperlipidemia.    Type 2 diabetes-she states her blood sugars at home are looking good.  Her A1c from her last visit dropped from 8.2-6.2.    Hypertension-she does check her blood pressure at home.  She states typically it is in the 120s over 80s.    Hyperlipidemia-her recent lipid profile shows her LDL still to be high.  She stopped taking her rosuvastatin about 6 weeks ago.  She felt it was causing her to have frequent headaches.        PHQ-2 Depression Screening  Little interest or pleasure in doing things?     Feeling down, depressed, or hopeless?     PHQ-2 Total Score     PHQ-9 Depression Screening  Little interest or pleasure in doing things?     Feeling down, depressed, or hopeless?     Trouble falling or staying asleep, or sleeping too much?     Feeling tired or having little energy?     Poor appetite or overeating?     Feeling bad about yourself - or that you are a failure or have let yourself or your family down?     Trouble concentrating on things, such as reading the newspaper or watching television?     Moving or speaking so slowly that other people could have noticed? Or the opposite - being so fidgety or restless that you have been moving around a lot more than usual?     Thoughts that you would be better off dead, or of hurting yourself in some way?     PHQ-9 Total Score     If you checked off any problems, how difficult have these problems made it for you to do your work, take care of things at home, or get along with other people?       No Known Allergies    Prior to Admission medications    Medication Sig Start Date End Date Taking? Authorizing Provider   amLODIPine (NORVASC) 10 MG tablet Take 1 tablet by mouth Daily. 1/22/24  Yes Jerry Coto, DO    glucose blood (Glucose Meter Test) test strip check blood sugar every AM fasting and record. (Dx: E11.9) 1/22/24  Yes Jerry Coto DO   Lancets (freestyle) lancets  10/3/22  Yes Provider, MD Wilman   Semaglutide,0.25 or 0.5MG/DOS, (Ozempic, 0.25 or 0.5 MG/DOSE,) 2 MG/1.5ML solution pen-injector Inject 0.5 mg under the skin into the appropriate area as directed 1 (One) Time Per Week. 2/7/24  Yes Jerry oCto DO   Synthroid 112 MCG tablet Take 1 tablet by mouth Daily. 1/22/24  Yes Jerry Coto DO   ezetimibe (Zetia) 10 MG tablet Take 1 tablet by mouth Daily.  Patient not taking: Reported on 4/23/2024 4/18/24   Jerry Coto DO   lisinopril (PRINIVIL,ZESTRIL) 20 MG tablet Take 1 tablet by mouth 2 (Two) Times a Day for 90 days. 8/17/23 1/22/24  Jerry Coto DO   rosuvastatin (CRESTOR) 40 MG tablet Take 1 tablet by mouth Daily.  Patient not taking: Reported on 4/23/2024 12/19/23   Jerry Coto DO        Patient Active Problem List   Diagnosis    Pap smear for cervical cancer screening    Acquired hypothyroidism    Diabetes mellitus, type 2    Essential hypertension    Post-menopause    Vitamin D deficiency    Generalized anxiety disorder    Chronic pain of left knee    History of colonic polyps    Hyperlipidemia    Chronic right shoulder pain        Past Surgical History:   Procedure Laterality Date    COLONOSCOPY      COLONOSCOPY N/A 3/30/2023    Procedure: COLONOSCOPY WITH POLYPECTOMY/BIOPSY;  Surgeon: Edd Marcelo MD;  Location: [facility] ENDOSCOPY;  Service: General;  Laterality: N/A;  COLON POLYP    ENDOMETRIAL ABLATION W/ NOVASURE      LEEP         Social History     Socioeconomic History    Marital status:    Tobacco Use    Smoking status: Never     Passive exposure: Never    Smokeless tobacco: Never   Vaping Use    Vaping status: Never Used   Substance and Sexual Activity    Alcohol use: Never    Drug use: Never    Sexual  "activity: Defer       Family History   Problem Relation Age of Onset    No Known Problems Mother     No Known Problems Father     No Known Problems Sister     No Known Problems Brother     No Known Problems Son     No Known Problems Daughter     No Known Problems Maternal Grandmother     No Known Problems Maternal Grandfather     No Known Problems Paternal Grandmother     No Known Problems Paternal Grandfather     No Known Problems Cousin     No Known Problems Other     Rheum arthritis Neg Hx     Osteoarthritis Neg Hx     Asthma Neg Hx     Diabetes Neg Hx     Heart failure Neg Hx     Hyperlipidemia Neg Hx     Hypertension Neg Hx     Migraines Neg Hx     Rashes / Skin problems Neg Hx     Seizures Neg Hx     Stroke Neg Hx     Thyroid disease Neg Hx        Family history, surgical history, past medical history, Allergies and meds reviewed with patient today and updated in [facility] EMR.     ROS:  Review of Systems   Constitutional:  Negative for fatigue.   HENT:  Negative for congestion, postnasal drip and rhinorrhea.    Eyes:  Negative for blurred vision and visual disturbance.   Respiratory:  Negative for cough, chest tightness, shortness of breath and wheezing.    Cardiovascular:  Negative for chest pain and palpitations.   Gastrointestinal:  Negative for constipation and diarrhea.   Endocrine: Negative for polydipsia and polyuria.   Allergic/Immunologic: Negative for environmental allergies.   Neurological:  Negative for headache.   Psychiatric/Behavioral:  Negative for depressed mood. The patient is not nervous/anxious.        OBJECTIVE:  Vitals:    04/23/24 1513   BP: 145/81   BP Location: Left arm   Patient Position: Sitting   Cuff Size: Adult   Pulse: 82   Temp: 98 °F (36.7 °C)   TempSrc: Temporal   SpO2: 98%   Weight: 75.8 kg (167 lb)   Height: 162.6 cm (64.02\")     No results found.   Body mass index is 28.65 kg/m².  No LMP recorded (lmp unknown). Patient is postmenopausal.    The 10-year ASCVD risk score (Mary Ann " NIXON, et al., 2019) is: 20.3%    Values used to calculate the score:      Age: 59 years      Sex: Female      Is Non- : Yes      Diabetic: Yes      Tobacco smoker: No      Systolic Blood Pressure: 145 mmHg      Is BP treated: Yes      HDL Cholesterol: 51 mg/dL      Total Cholesterol: 204 mg/dL     Physical Exam  Vitals and nursing note reviewed.   Constitutional:       General: She is not in acute distress.     Appearance: Normal appearance. She is normal weight.   HENT:      Head: Normocephalic.      Right Ear: Tympanic membrane, ear canal and external ear normal.      Left Ear: Tympanic membrane, ear canal and external ear normal.      Nose: Nose normal.      Mouth/Throat:      Mouth: Mucous membranes are moist.      Pharynx: Oropharynx is clear.   Eyes:      General: No scleral icterus.     Conjunctiva/sclera: Conjunctivae normal.      Pupils: Pupils are equal, round, and reactive to light.   Cardiovascular:      Rate and Rhythm: Normal rate and regular rhythm.      Pulses: Normal pulses.      Heart sounds: Normal heart sounds. No murmur heard.  Pulmonary:      Effort: Pulmonary effort is normal.      Breath sounds: Normal breath sounds. No wheezing, rhonchi or rales.   Musculoskeletal:      Cervical back: Neck supple. No rigidity or tenderness.   Lymphadenopathy:      Cervical: No cervical adenopathy.   Skin:     General: Skin is warm and dry.      Coloration: Skin is not jaundiced.      Findings: No rash.   Neurological:      General: No focal deficit present.      Mental Status: She is alert and oriented to person, place, and time.   Psychiatric:         Mood and Affect: Mood normal.         Thought Content: Thought content normal.         Judgment: Judgment normal.         Procedures    Lab on 04/17/2024   Component Date Value Ref Range Status    Glucose 04/17/2024 135 (H)  65 - 99 mg/dL Final    BUN 04/17/2024 10  6 - 20 mg/dL Final    Creatinine 04/17/2024 0.96  0.57 - 1.00 mg/dL Final     Sodium 04/17/2024 138  136 - 145 mmol/L Final    Potassium 04/17/2024 4.1  3.5 - 5.2 mmol/L Final    Chloride 04/17/2024 102  98 - 107 mmol/L Final    CO2 04/17/2024 27.0  22.0 - 29.0 mmol/L Final    Calcium 04/17/2024 9.2  8.6 - 10.5 mg/dL Final    Total Protein 04/17/2024 8.2  6.0 - 8.5 g/dL Final    Albumin 04/17/2024 4.5  3.5 - 5.2 g/dL Final    ALT (SGPT) 04/17/2024 19  1 - 33 U/L Final    AST (SGOT) 04/17/2024 13  1 - 32 U/L Final    Alkaline Phosphatase 04/17/2024 83  39 - 117 U/L Final    Total Bilirubin 04/17/2024 0.5  0.0 - 1.2 mg/dL Final    Globulin 04/17/2024 3.7  gm/dL Final    A/G Ratio 04/17/2024 1.2  g/dL Final    BUN/Creatinine Ratio 04/17/2024 10.4  7.0 - 25.0 Final    Anion Gap 04/17/2024 9.0  5.0 - 15.0 mmol/L Final    eGFR 04/17/2024 69.2  >60.0 mL/min/1.73 Final    Total Cholesterol 04/17/2024 204 (H)  0 - 200 mg/dL Final    Triglycerides 04/17/2024 60  0 - 150 mg/dL Final    HDL Cholesterol 04/17/2024 51  40 - 60 mg/dL Final    LDL Cholesterol  04/17/2024 142 (H)  0 - 100 mg/dL Final    VLDL Cholesterol 04/17/2024 11  5 - 40 mg/dL Final    LDL/HDL Ratio 04/17/2024 2.76   Final    Hemoglobin A1C 04/17/2024 6.20 (H)  4.80 - 5.60 % Final    TSH 04/17/2024 2.950  0.270 - 4.200 uIU/mL Final    Free T4 04/17/2024 1.63  0.93 - 1.70 ng/dL Final    T4 results may be falsely increased if patient taking Biotin.       ASSESSMENT/ PLAN:    Diagnoses and all orders for this visit:    1. Pure hypercholesterolemia (Primary)  Assessment & Plan:   With her significantly elevated cholesterol so she really needs to be on a potent statin.  She still needs at least a 50% reduction at this time.  I encouraged her to retry the rosuvastatin 1 more time.      2. Essential hypertension  Assessment & Plan:  Her blood pressure at home is consistently very good.  Will not make any changes at this time and update her labs in another 3 to 4 months      3. Type 2 diabetes mellitus without complication, without long-term  current use of insulin  Assessment & Plan:  Her diabetes is much improved.  She has had a 2% drop in her A1c.  She will continue her current medication                 Orders Placed Today:     No orders of the defined types were placed in this encounter.       Management Plan:     An After Visit Summary was printed and given to the patient at discharge.    Follow-up: Return in about 4 months (around 8/23/2024) for Recheck.    Jerry Coto,  4/23/2024 15:45 EDT  This note was electronically signed.

## 2024-04-23 NOTE — ASSESSMENT & PLAN NOTE
Her blood pressure at home is consistently very good.  Will not make any changes at this time and update her labs in another 3 to 4 months

## 2024-07-23 ENCOUNTER — OFFICE VISIT (OUTPATIENT)
Dept: FAMILY MEDICINE CLINIC | Facility: CLINIC | Age: 59
End: 2024-07-23
Payer: OTHER GOVERNMENT

## 2024-07-23 VITALS
HEART RATE: 96 BPM | OXYGEN SATURATION: 98 % | TEMPERATURE: 97.8 F | WEIGHT: 157 LBS | DIASTOLIC BLOOD PRESSURE: 75 MMHG | HEIGHT: 64 IN | SYSTOLIC BLOOD PRESSURE: 130 MMHG | BODY MASS INDEX: 26.8 KG/M2

## 2024-07-23 DIAGNOSIS — E78.00 PURE HYPERCHOLESTEROLEMIA: Primary | ICD-10-CM

## 2024-07-23 DIAGNOSIS — I10 ESSENTIAL HYPERTENSION: ICD-10-CM

## 2024-07-23 DIAGNOSIS — E03.9 ACQUIRED HYPOTHYROIDISM: ICD-10-CM

## 2024-07-23 DIAGNOSIS — E11.9 TYPE 2 DIABETES MELLITUS WITHOUT COMPLICATION, WITHOUT LONG-TERM CURRENT USE OF INSULIN: ICD-10-CM

## 2024-07-23 LAB
ALBUMIN SERPL-MCNC: 4.4 G/DL (ref 3.5–5.2)
ALBUMIN/GLOB SERPL: 1.2 G/DL
ALP SERPL-CCNC: 83 U/L (ref 39–117)
ALT SERPL W P-5'-P-CCNC: 11 U/L (ref 1–33)
ANION GAP SERPL CALCULATED.3IONS-SCNC: 13.1 MMOL/L (ref 5–15)
AST SERPL-CCNC: 15 U/L (ref 1–32)
BILIRUB SERPL-MCNC: 0.3 MG/DL (ref 0–1.2)
BUN SERPL-MCNC: 10 MG/DL (ref 6–20)
BUN/CREAT SERPL: 15.9 (ref 7–25)
CALCIUM SPEC-SCNC: 9.5 MG/DL (ref 8.6–10.5)
CHLORIDE SERPL-SCNC: 99 MMOL/L (ref 98–107)
CHOLEST SERPL-MCNC: 210 MG/DL (ref 0–200)
CO2 SERPL-SCNC: 24.9 MMOL/L (ref 22–29)
CREAT SERPL-MCNC: 0.63 MG/DL (ref 0.57–1)
EGFRCR SERPLBLD CKD-EPI 2021: 103.6 ML/MIN/1.73
GLOBULIN UR ELPH-MCNC: 3.7 GM/DL
GLUCOSE SERPL-MCNC: 83 MG/DL (ref 65–99)
HBA1C MFR BLD: 6.5 % (ref 4.8–5.6)
HDLC SERPL-MCNC: 53 MG/DL (ref 40–60)
LDLC SERPL CALC-MCNC: 147 MG/DL (ref 0–100)
LDLC/HDLC SERPL: 2.75 {RATIO}
POTASSIUM SERPL-SCNC: 3.7 MMOL/L (ref 3.5–5.2)
PROT SERPL-MCNC: 8.1 G/DL (ref 6–8.5)
SODIUM SERPL-SCNC: 137 MMOL/L (ref 136–145)
TRIGL SERPL-MCNC: 57 MG/DL (ref 0–150)
TSH SERPL DL<=0.05 MIU/L-ACNC: 1.52 UIU/ML (ref 0.27–4.2)
VLDLC SERPL-MCNC: 10 MG/DL (ref 5–40)

## 2024-07-23 PROCEDURE — 80053 COMPREHEN METABOLIC PANEL: CPT | Performed by: FAMILY MEDICINE

## 2024-07-23 PROCEDURE — 99214 OFFICE O/P EST MOD 30 MIN: CPT | Performed by: FAMILY MEDICINE

## 2024-07-23 PROCEDURE — 90471 IMMUNIZATION ADMIN: CPT | Performed by: FAMILY MEDICINE

## 2024-07-23 PROCEDURE — 90715 TDAP VACCINE 7 YRS/> IM: CPT | Performed by: FAMILY MEDICINE

## 2024-07-23 PROCEDURE — 84443 ASSAY THYROID STIM HORMONE: CPT | Performed by: FAMILY MEDICINE

## 2024-07-23 PROCEDURE — 83036 HEMOGLOBIN GLYCOSYLATED A1C: CPT | Performed by: FAMILY MEDICINE

## 2024-07-23 PROCEDURE — 36415 COLL VENOUS BLD VENIPUNCTURE: CPT | Performed by: FAMILY MEDICINE

## 2024-07-23 PROCEDURE — 80061 LIPID PANEL: CPT | Performed by: FAMILY MEDICINE

## 2024-07-23 RX ORDER — AMLODIPINE BESYLATE 10 MG/1
10 TABLET ORAL DAILY
Qty: 90 TABLET | Refills: 1 | Status: SHIPPED | OUTPATIENT
Start: 2024-07-23

## 2024-07-23 RX ORDER — LANCETS 28 GAUGE
EACH MISCELLANEOUS
Qty: 100 EACH | Refills: 1 | Status: SHIPPED | OUTPATIENT
Start: 2024-07-23

## 2024-07-23 RX ORDER — ROSUVASTATIN CALCIUM 40 MG/1
40 TABLET, COATED ORAL DAILY
Qty: 90 TABLET | Refills: 1 | Status: SHIPPED | OUTPATIENT
Start: 2024-07-23

## 2024-07-23 RX ORDER — BLOOD SUGAR DIAGNOSTIC
STRIP MISCELLANEOUS
Qty: 100 EACH | Refills: 3 | Status: SHIPPED | OUTPATIENT
Start: 2024-07-23

## 2024-07-23 RX ORDER — SEMAGLUTIDE 1.34 MG/ML
0.5 INJECTION, SOLUTION SUBCUTANEOUS WEEKLY
Qty: 4.5 ML | Refills: 5 | Status: SHIPPED | OUTPATIENT
Start: 2024-07-23

## 2024-07-23 RX ORDER — LISINOPRIL 20 MG/1
20 TABLET ORAL 2 TIMES DAILY
Qty: 180 TABLET | Refills: 1 | Status: SHIPPED | OUTPATIENT
Start: 2024-07-23

## 2024-07-23 RX ORDER — LEVOTHYROXINE SODIUM 112 MCG
112 TABLET ORAL DAILY
Qty: 90 TABLET | Refills: 1 | Status: SHIPPED | OUTPATIENT
Start: 2024-07-23

## 2024-07-23 RX ORDER — EZETIMIBE 10 MG/1
10 TABLET ORAL DAILY
Qty: 90 TABLET | Refills: 1 | Status: SHIPPED | OUTPATIENT
Start: 2024-07-23

## 2024-07-23 NOTE — PROGRESS NOTES
Chief Complaint   Patient presents with    Follow-up     3 month hypercholesterolemia    Hypertension    Diabetes        Subjective     Lynn Stoner  has a past medical history of Hyperlipidemia.    Type 2 diabetes-she checks her blood sugars on a daily basis.  They have all been right around 100.  Occasionally they are a little high when she eats a little bit more carbs than usual these are in have never been higher than 139.  She denies any blurred vision excessive thirst or excessive urination.  She has a routine eye exam coming up later this week.    Hypertension-she does check her blood pressure at home about once or twice a month.  When she checks it at home there is on the lower side.  Is a little bit higher here today at 130/75.    Hyperlipidemia-she does take her rosuvastatin.  She states though she does not necessarily take it every day and about every other day though.        PHQ-2 Depression Screening  Little interest or pleasure in doing things?     Feeling down, depressed, or hopeless?     PHQ-2 Total Score     PHQ-9 Depression Screening  Little interest or pleasure in doing things?     Feeling down, depressed, or hopeless?     Trouble falling or staying asleep, or sleeping too much?     Feeling tired or having little energy?     Poor appetite or overeating?     Feeling bad about yourself - or that you are a failure or have let yourself or your family down?     Trouble concentrating on things, such as reading the newspaper or watching television?     Moving or speaking so slowly that other people could have noticed? Or the opposite - being so fidgety or restless that you have been moving around a lot more than usual?     Thoughts that you would be better off dead, or of hurting yourself in some way?     PHQ-9 Total Score     If you checked off any problems, how difficult have these problems made it for you to do your work, take care of things at home, or get along with other people?       No Known  Allergies    Prior to Admission medications    Medication Sig Start Date End Date Taking? Authorizing Provider   amLODIPine (NORVASC) 10 MG tablet Take 1 tablet by mouth Daily. 1/22/24  Yes Jerry Coto DO   ezetimibe (Zetia) 10 MG tablet Take 1 tablet by mouth Daily. 4/18/24  Yes Jerry Coto DO   glucose blood (Glucose Meter Test) test strip check blood sugar every AM fasting and record. (Dx: E11.9) 1/22/24  Yes Jerry Coto DO   Lancets (freestyle) lancets  10/3/22  Yes Provider, MD Wilman   lisinopril (PRINIVIL,ZESTRIL) 20 MG tablet Take 1 tablet by mouth 2 (Two) Times a Day for 90 days. 8/17/23 7/23/24 Yes Jerry Coto DO   Semaglutide,0.25 or 0.5MG/DOS, (Ozempic, 0.25 or 0.5 MG/DOSE,) 2 MG/1.5ML solution pen-injector Inject 0.5 mg under the skin into the appropriate area as directed 1 (One) Time Per Week. 2/7/24  Yes Jerry Coto DO   Synthroid 112 MCG tablet Take 1 tablet by mouth Daily. 1/22/24  Yes Jerry Coto DO   rosuvastatin (CRESTOR) 40 MG tablet Take 1 tablet by mouth Daily.  Patient not taking: Reported on 7/23/2024 12/19/23   Jerry Coto DO        Patient Active Problem List   Diagnosis    Pap smear for cervical cancer screening    Acquired hypothyroidism    Diabetes mellitus, type 2    Essential hypertension    Post-menopause    Vitamin D deficiency    Generalized anxiety disorder    Chronic pain of left knee    History of colonic polyps    Hyperlipidemia    Chronic right shoulder pain        Past Surgical History:   Procedure Laterality Date    COLONOSCOPY      COLONOSCOPY N/A 3/30/2023    Procedure: COLONOSCOPY WITH POLYPECTOMY/BIOPSY;  Surgeon: Edd Marcelo MD;  Location: [facility] ENDOSCOPY;  Service: General;  Laterality: N/A;  COLON POLYP    ENDOMETRIAL ABLATION W/ NOVASURE      LEEP         Social History     Socioeconomic History    Marital status:    Tobacco Use    Smoking status: Never      "Passive exposure: Never    Smokeless tobacco: Never   Vaping Use    Vaping status: Never Used   Substance and Sexual Activity    Alcohol use: Never    Drug use: Never    Sexual activity: Defer       Family History   Problem Relation Age of Onset    No Known Problems Mother     No Known Problems Father     No Known Problems Sister     No Known Problems Brother     No Known Problems Son     No Known Problems Daughter     No Known Problems Maternal Grandmother     No Known Problems Maternal Grandfather     No Known Problems Paternal Grandmother     No Known Problems Paternal Grandfather     No Known Problems Cousin     No Known Problems Other     Rheum arthritis Neg Hx     Osteoarthritis Neg Hx     Asthma Neg Hx     Diabetes Neg Hx     Heart failure Neg Hx     Hyperlipidemia Neg Hx     Hypertension Neg Hx     Migraines Neg Hx     Rashes / Skin problems Neg Hx     Seizures Neg Hx     Stroke Neg Hx     Thyroid disease Neg Hx        Family history, surgical history, past medical history, Allergies and meds reviewed with patient today and updated in [employer] EMR.     ROS:  Review of Systems   Constitutional:  Negative for fatigue.   HENT:  Negative for congestion, postnasal drip and rhinorrhea.    Eyes:  Negative for blurred vision and visual disturbance.   Respiratory:  Negative for cough, chest tightness, shortness of breath and wheezing.    Cardiovascular:  Negative for chest pain and palpitations.   Endocrine: Negative for polydipsia and polyuria.   Allergic/Immunologic: Negative for environmental allergies.   Neurological:  Negative for headache.   Psychiatric/Behavioral:  Negative for depressed mood. The patient is not nervous/anxious.        OBJECTIVE:  Vitals:    07/23/24 1405   BP: 130/75   BP Location: Left arm   Patient Position: Sitting   Pulse: 96   Temp: 97.8 °F (36.6 °C)   SpO2: 98%   Weight: 71.2 kg (157 lb)   Height: 162.6 cm (64.02\")     No results found.   Body mass index is 26.93 kg/m².  No LMP recorded " (lmp unknown). Patient is postmenopausal.    The 10-year ASCVD risk score (Mary Ann ALICEA, et al., 2019) is: 14.7%    Values used to calculate the score:      Age: 59 years      Sex: Female      Is Non- : Yes      Diabetic: Yes      Tobacco smoker: No      Systolic Blood Pressure: 130 mmHg      Is BP treated: Yes      HDL Cholesterol: 51 mg/dL      Total Cholesterol: 204 mg/dL     Physical Exam  Vitals and nursing note reviewed.   Constitutional:       General: She is not in acute distress.     Appearance: Normal appearance. She is normal weight.   HENT:      Head: Normocephalic and atraumatic.      Right Ear: Tympanic membrane, ear canal and external ear normal.      Left Ear: Tympanic membrane, ear canal and external ear normal.      Nose: Nose normal.      Mouth/Throat:      Mouth: Mucous membranes are moist.      Pharynx: Oropharynx is clear.   Eyes:      General: No scleral icterus.     Conjunctiva/sclera: Conjunctivae normal.      Pupils: Pupils are equal, round, and reactive to light.   Cardiovascular:      Rate and Rhythm: Normal rate and regular rhythm.      Pulses: Normal pulses.           Dorsalis pedis pulses are 2+ on the right side and 2+ on the left side.        Posterior tibial pulses are 2+ on the right side and 2+ on the left side.      Heart sounds: Normal heart sounds. No murmur heard.  Pulmonary:      Effort: Pulmonary effort is normal.      Breath sounds: Normal breath sounds. No wheezing, rhonchi or rales.   Musculoskeletal:      Cervical back: Tenderness present.      Right foot: No deformity or bunion.      Left foot: No deformity or bunion.   Feet:      Right foot:      Protective Sensation: 8 sites tested.  8 sites sensed.      Skin integrity: Skin integrity normal.      Toenail Condition: Right toenails are abnormally thick.      Left foot:      Protective Sensation: 8 sites tested.  8 sites sensed.      Skin integrity: Skin integrity normal.      Toenail Condition:  Left toenails are abnormally thick.   Lymphadenopathy:      Cervical: No cervical adenopathy.   Skin:     General: Skin is warm and dry.      Coloration: Skin is not jaundiced.   Neurological:      General: No focal deficit present.      Mental Status: She is alert and oriented to person, place, and time.   Psychiatric:         Mood and Affect: Mood normal.         Thought Content: Thought content normal.         Procedures    No visits with results within 30 Day(s) from this visit.   Latest known visit with results is:   Lab on 04/17/2024   Component Date Value Ref Range Status    Glucose 04/17/2024 135 (H)  65 - 99 mg/dL Final    BUN 04/17/2024 10  6 - 20 mg/dL Final    Creatinine 04/17/2024 0.96  0.57 - 1.00 mg/dL Final    Sodium 04/17/2024 138  136 - 145 mmol/L Final    Potassium 04/17/2024 4.1  3.5 - 5.2 mmol/L Final    Chloride 04/17/2024 102  98 - 107 mmol/L Final    CO2 04/17/2024 27.0  22.0 - 29.0 mmol/L Final    Calcium 04/17/2024 9.2  8.6 - 10.5 mg/dL Final    Total Protein 04/17/2024 8.2  6.0 - 8.5 g/dL Final    Albumin 04/17/2024 4.5  3.5 - 5.2 g/dL Final    ALT (SGPT) 04/17/2024 19  1 - 33 U/L Final    AST (SGOT) 04/17/2024 13  1 - 32 U/L Final    Alkaline Phosphatase 04/17/2024 83  39 - 117 U/L Final    Total Bilirubin 04/17/2024 0.5  0.0 - 1.2 mg/dL Final    Globulin 04/17/2024 3.7  gm/dL Final    A/G Ratio 04/17/2024 1.2  g/dL Final    BUN/Creatinine Ratio 04/17/2024 10.4  7.0 - 25.0 Final    Anion Gap 04/17/2024 9.0  5.0 - 15.0 mmol/L Final    eGFR 04/17/2024 69.2  >60.0 mL/min/1.73 Final    Total Cholesterol 04/17/2024 204 (H)  0 - 200 mg/dL Final    Triglycerides 04/17/2024 60  0 - 150 mg/dL Final    HDL Cholesterol 04/17/2024 51  40 - 60 mg/dL Final    LDL Cholesterol  04/17/2024 142 (H)  0 - 100 mg/dL Final    VLDL Cholesterol 04/17/2024 11  5 - 40 mg/dL Final    LDL/HDL Ratio 04/17/2024 2.76   Final    Hemoglobin A1C 04/17/2024 6.20 (H)  4.80 - 5.60 % Final    TSH 04/17/2024 2.950  0.270 -  4.200 uIU/mL Final    Free T4 04/17/2024 1.63  0.93 - 1.70 ng/dL Final    T4 results may be falsely increased if patient taking Biotin.       ASSESSMENT/ PLAN:    Diagnoses and all orders for this visit:    1. Pure hypercholesterolemia (Primary)  Assessment & Plan:   Will update her lipid profile with her labs today.      2. Essential hypertension  Assessment & Plan:  Her blood pressure is good here today.  Will not make any changes in her medication.      3. Type 2 diabetes mellitus without complication, without long-term current use of insulin  Assessment & Plan:  Her diabetic control is excellent.  Will update her A1c.    Orders:  -     Comprehensive Metabolic Panel  -     Lipid Panel  -     Hemoglobin A1c  -     Microalbumin / Creatinine Urine Ratio - Urine, Clean Catch    4. Acquired hypothyroidism  Assessment & Plan:  Will update her TSH with her labs.    Orders:  -     TSH Rfx On Abnormal To Free T4    Other orders  -     Tdap Vaccine => 6yo IM (BOOSTRIX/ADACEL)  -     amLODIPine (NORVASC) 10 MG tablet; Take 1 tablet by mouth Daily.  Dispense: 90 tablet; Refill: 1  -     ezetimibe (Zetia) 10 MG tablet; Take 1 tablet by mouth Daily.  Dispense: 90 tablet; Refill: 1  -     lisinopril (PRINIVIL,ZESTRIL) 20 MG tablet; Take 1 tablet by mouth 2 (Two) Times a Day.  Dispense: 180 tablet; Refill: 1  -     glucose blood (Glucose Meter Test) test strip; check blood sugar every AM fasting and record. (Dx: E11.9)  Dispense: 100 each; Refill: 3  -     Lancets (freestyle) lancets; Check blood sugar every morning fasting and record.  Dispense: 100 each; Refill: 1  -     rosuvastatin (CRESTOR) 40 MG tablet; Take 1 tablet by mouth Daily.  Dispense: 90 tablet; Refill: 1  -     Semaglutide,0.25 or 0.5MG/DOS, (Ozempic, 0.25 or 0.5 MG/DOSE,) 2 MG/1.5ML solution pen-injector; Inject 0.5 mg under the skin into the appropriate area as directed 1 (One) Time Per Week.  Dispense: 4.5 mL; Refill: 5  -     Synthroid 112 MCG tablet; Take 1  tablet by mouth Daily.  Dispense: 90 tablet; Refill: 1               Orders Placed Today:     New Medications Ordered This Visit   Medications    amLODIPine (NORVASC) 10 MG tablet     Sig: Take 1 tablet by mouth Daily.     Dispense:  90 tablet     Refill:  1    ezetimibe (Zetia) 10 MG tablet     Sig: Take 1 tablet by mouth Daily.     Dispense:  90 tablet     Refill:  1    lisinopril (PRINIVIL,ZESTRIL) 20 MG tablet     Sig: Take 1 tablet by mouth 2 (Two) Times a Day.     Dispense:  180 tablet     Refill:  1    glucose blood (Glucose Meter Test) test strip     Sig: check blood sugar every AM fasting and record. (Dx: E11.9)     Dispense:  100 each     Refill:  3    Lancets (freestyle) lancets     Sig: Check blood sugar every morning fasting and record.     Dispense:  100 each     Refill:  1    rosuvastatin (CRESTOR) 40 MG tablet     Sig: Take 1 tablet by mouth Daily.     Dispense:  90 tablet     Refill:  1    Semaglutide,0.25 or 0.5MG/DOS, (Ozempic, 0.25 or 0.5 MG/DOSE,) 2 MG/1.5ML solution pen-injector     Sig: Inject 0.5 mg under the skin into the appropriate area as directed 1 (One) Time Per Week.     Dispense:  4.5 mL     Refill:  5    Synthroid 112 MCG tablet     Sig: Take 1 tablet by mouth Daily.     Dispense:  90 tablet     Refill:  1        Management Plan:     An After Visit Summary was printed and given to the patient at discharge.    Follow-up: Return in about 6 months (around 1/23/2025).    Jerry Coto DO 7/23/2024 14:40 EDT  This note was electronically signed.

## 2024-11-30 ENCOUNTER — HOSPITAL ENCOUNTER (EMERGENCY)
Facility: HOSPITAL | Age: 59
Discharge: HOME | End: 2024-11-30
Attending: EMERGENCY MEDICINE
Payer: OTHER GOVERNMENT

## 2024-11-30 VITALS
HEART RATE: 93 BPM | HEIGHT: 64 IN | OXYGEN SATURATION: 100 % | BODY MASS INDEX: 27.29 KG/M2 | DIASTOLIC BLOOD PRESSURE: 69 MMHG | TEMPERATURE: 98.4 F | SYSTOLIC BLOOD PRESSURE: 147 MMHG | WEIGHT: 159.83 LBS | RESPIRATION RATE: 18 BRPM

## 2024-11-30 DIAGNOSIS — N60.02 BREAST CYST, LEFT: Primary | ICD-10-CM

## 2024-11-30 DIAGNOSIS — R59.0 AXILLARY LYMPHADENOPATHY: ICD-10-CM

## 2024-11-30 PROCEDURE — 99282 EMERGENCY DEPT VISIT SF MDM: CPT

## 2024-11-30 NOTE — ED PROVIDER NOTES
Time: 5:59 PM EST  Date of encounter:  11/30/2024  Independent Historian/Clinical History and Information was obtained by:   Patient    History is limited by: N/A    Chief Complaint: Breast mass      History of Present Illness:  Patient is a 59 y.o. year old female who presents to the emergency department for evaluation of breast mass.  Patient presents the emergency department today for palpating an abnormal area on her left breast in the upper outer quadrant.  She states it has not been causing her any pain she was just doing her monthly check and noticed it today.  Patient has no personal or family history of breast cancer.  Patient has not been have any fevers, color change, or drainage from the area.      Patient Care Team  Primary Care Provider: Jerry Coto DO    Past Medical History:     No Known Allergies  Past Medical History:   Diagnosis Date    Hyperlipidemia     Hypertension     Hypothyroid      Past Surgical History:   Procedure Laterality Date    COLONOSCOPY      COLONOSCOPY N/A 3/30/2023    Procedure: COLONOSCOPY WITH POLYPECTOMY/BIOPSY;  Surgeon: Edd Marcelo MD;  Location: formerly [facility] ENDOSCOPY;  Service: General;  Laterality: N/A;  COLON POLYP    ENDOMETRIAL ABLATION W/ NOVASURE      LEEP       Family History   Problem Relation Age of Onset    No Known Problems Mother     No Known Problems Father     No Known Problems Sister     No Known Problems Brother     No Known Problems Son     No Known Problems Daughter     No Known Problems Maternal Grandmother     No Known Problems Maternal Grandfather     No Known Problems Paternal Grandmother     No Known Problems Paternal Grandfather     No Known Problems Cousin     No Known Problems Other     Rheum arthritis Neg Hx     Osteoarthritis Neg Hx     Asthma Neg Hx     Diabetes Neg Hx     Heart failure Neg Hx     Hyperlipidemia Neg Hx     Hypertension Neg Hx     Migraines Neg Hx     Rashes / Skin problems Neg Hx     Seizures Neg Hx     Stroke  "Neg Hx     Thyroid disease Neg Hx        Home Medications:  Prior to Admission medications    Medication Sig Start Date End Date Taking? Authorizing Provider   amLODIPine (NORVASC) 10 MG tablet Take 1 tablet by mouth Daily. 7/23/24   Jerry Coto DO   ezetimibe (Zetia) 10 MG tablet Take 1 tablet by mouth Daily. 7/23/24   Jerry Coto DO   glucose blood (Glucose Meter Test) test strip check blood sugar every AM fasting and record. (Dx: E11.9) 7/23/24   Jerry Coto DO   Lancets (freestyle) lancets Check blood sugar every morning fasting and record. 7/23/24   Jerry Coto DO   lisinopril (PRINIVIL,ZESTRIL) 20 MG tablet Take 1 tablet by mouth 2 (Two) Times a Day. 7/23/24   Jerry Coto DO   rosuvastatin (CRESTOR) 40 MG tablet Take 1 tablet by mouth Daily. 7/23/24   Jerry Coto DO   Semaglutide,0.25 or 0.5MG/DOS, (Ozempic, 0.25 or 0.5 MG/DOSE,) 2 MG/1.5ML solution pen-injector Inject 0.5 mg under the skin into the appropriate area as directed 1 (One) Time Per Week. 7/23/24   Jerry Coto DO   Synthroid 112 MCG tablet Take 1 tablet by mouth Daily. 7/23/24   Jerry Coto DO        Social History:   Social History     Tobacco Use    Smoking status: Never     Passive exposure: Never    Smokeless tobacco: Never   Vaping Use    Vaping status: Never Used   Substance Use Topics    Alcohol use: Never    Drug use: Never         Review of Systems:  Review of Systems   Constitutional:  Negative for fever.   Skin:  Negative for color change and wound.        Breast cyst        Physical Exam:  /69 (BP Location: Left arm, Patient Position: Sitting)   Pulse 93   Temp 98.4 °F (36.9 °C) (Oral)   Resp 18   Ht 162.6 cm (64\")   Wt 72.5 kg (159 lb 13.3 oz)   LMP  (LMP Unknown)   SpO2 100%   BMI 27.44 kg/m²     Physical Exam  Vitals and nursing note reviewed.   Constitutional:       General: She is not in acute distress.     " Appearance: Normal appearance. She is not ill-appearing, toxic-appearing or diaphoretic.   HENT:      Head: Normocephalic and atraumatic.      Nose: Nose normal.   Eyes:      Conjunctiva/sclera: Conjunctivae normal.   Cardiovascular:      Rate and Rhythm: Normal rate and regular rhythm.      Heart sounds: Normal heart sounds.   Pulmonary:      Effort: Pulmonary effort is normal.      Breath sounds: Normal breath sounds.   Chest:       Musculoskeletal:         General: Normal range of motion.      Cervical back: Normal range of motion and neck supple.   Lymphadenopathy:      Upper Body:      Left upper body: Axillary adenopathy present.   Skin:     General: Skin is warm and dry.   Neurological:      General: No focal deficit present.      Mental Status: She is alert and oriented to person, place, and time.   Psychiatric:         Mood and Affect: Mood normal.         Behavior: Behavior normal.         Thought Content: Thought content normal.         Judgment: Judgment normal.                Procedures:  Procedures      Medical Decision Making:    Comorbidities that affect care:    Hypertension, hyperlipidemia, thyroid disorder    External Notes reviewed:    Previous Clinic Note: Patient last in by [facility] on 7-      The following orders were placed and all results were independently analyzed by me:  Orders Placed This Encounter   Procedures    Ambulatory Referral to Breast Surgery       Medications Given in the Emergency Department:  Medications - No data to display     ED Course:         Labs:    Lab Results (last 24 hours)       ** No results found for the last 24 hours. **             Imaging:    No Radiology Exams Resulted Within Past 24 Hours      Differential Diagnosis and Discussion:    Abscess: Differential diagnosis for an abscess includes but is not limited to bacterial or fungal infections, foreign body reactions, malignancies, and autoimmune or inflammatory conditions.      [facility]  Number of  Diagnoses or Management Options  Axillary lymphadenopathy  Breast cyst, left  Diagnosis management comments: Patient presented to the emergency department today for abnormal nodule of the breast and left axillary area.  Area is able to be palpated.  I considered breast ultrasound and mammogram however I am unable to order these images in the emergency department.  I sent epic chat to Dr. Clark who I am referring patient to at the breast clinic for further evaluation.    Risk of Complications, Morbidity, and/or Mortality  Presenting problems: moderate  Diagnostic procedures: low  Management options: low    Patient Progress  Patient progress: stable       Patient Care Considerations:    ANTIBIOTICS: I considered prescribing antibiotics as an outpatient however no bacterial focus of infection was found.      Consultants/Shared Management Plan:    None    Social Determinants of Health:    Patient is independent, reliable, and has access to care.       Disposition and Care Coordination:    Discharged: The patient is suitable and stable for discharge with no need for consideration of admission.    I have explained the patient´s condition, diagnoses and treatment plan based on the information available to me at this time. I have answered questions and addressed any concerns. The patient has a good  understanding of the patient´s diagnosis, condition, and treatment plan as can be expected at this point. The vital signs have been stable. The patient´s condition is stable and appropriate for discharge from the emergency department.      The patient will pursue further outpatient evaluation with the primary care physician or other designated or consulting physician as outlined in the discharge instructions. They are agreeable to this plan of care and follow-up instructions have been explained in detail. The patient has received these instructions in written format and has expressed an understanding of the discharge  instructions. The patient is aware that any significant change in condition or worsening of symptoms should prompt an immediate return to this or the closest emergency department or call to 1.  I have explained discharge medications and the need for follow up with the patient/caretakers. This was also printed in the discharge instructions. Patient was discharged with the following medications and follow up:      Medication List      No changes were made to your prescriptions during this visit.      Crystal Clark MD  [address] [place] [address]  [phone]    Schedule an appointment as soon as possible for a visit          Final diagnoses:   Breast cyst, left   Axillary lymphadenopathy        ED Disposition       ED Disposition   Discharge    Condition   Stable    Comment   --               This medical record created using voice recognition software.             Ranjit Painter PA-C  11/30/24 [id number]

## 2024-11-30 NOTE — DISCHARGE INSTRUCTIONS
I have sent referral to Dr. Clark and I have contacted her in the emergency department today and she is aware of this.  Her office should contact you Monday to schedule a breast ultrasound and mammogram.

## 2024-12-11 ENCOUNTER — OFFICE VISIT (OUTPATIENT)
Dept: SURGERY | Facility: CLINIC | Age: 59
End: 2024-12-11
Payer: OTHER GOVERNMENT

## 2024-12-11 VITALS — BODY MASS INDEX: 27.31 KG/M2 | WEIGHT: 160 LBS | HEIGHT: 64 IN

## 2024-12-11 DIAGNOSIS — R59.9 ADENOPATHY: Primary | ICD-10-CM

## 2024-12-11 NOTE — PROGRESS NOTES
"Chief Complaint: NEW PROBLEM (BREAST CYST)    Subjective         History of Present Illness  Lynn Stoner is a 59 y.o. female presents to [facility] to be seen for a mass near her left medial axillary area.  She was doing a breast exam and noticed a \" ball like\" area near her upper left chest/axilla and it has since resolved.  Her most recent imaging is shown below:      Study Result    Narrative & Impression   MAMMO SCREENING DIGITAL TOMOSYNTHESIS BILATERAL W CAD-     DATE OF EXAM: 3/25/2024 5:26 PM     INDICATION: Breast cancer screening; Z12.31-Encounter for screening  mammogram for malignant neoplasm of breast      COMPARISON: Multiple prior mammograms dating back to 1/29/2020.     TECHNIQUE:     MLO and CC views were obtained according to routine screening breast  mammography protocol.  Tomosynthesis was utilized. These mammographic  images were interpreted with the assistance of a computer aided  detection system.     The patient's information is entered into a computerized reminder system  with a targeted due date for the next mammogram.        FINDINGS:     No suspicious mass, architectural distortions, or suspicious  microcalcifications are identified.        IMPRESSION:  No mammographic signs of malignancy. Recommend annual screening  mammography     TISSUE DENSITY: There are scattered areas of fibroglandular density.     BI-RADS ASSESSMENT: BI-RADS 1. Negative.              Objective     Past Medical History:   Diagnosis Date    Hyperlipidemia     Hypertension     Hypothyroid        Past Surgical History:   Procedure Laterality Date    COLONOSCOPY      COLONOSCOPY N/A 3/30/2023    Procedure: COLONOSCOPY WITH POLYPECTOMY/BIOPSY;  Surgeon: Edd Marcelo MD;  Location: [facility] END[facility];  Service: General;  Laterality: N/A;  COLON POLYP    ENDOMETRIAL ABLATION W/ NOVASURE      LEEP           Current Outpatient Medications:     amLODIPine (NORVASC) 10 MG tablet, Take " 1 tablet by mouth Daily., Disp: 90 tablet, Rfl: 1    ezetimibe (Zetia) 10 MG tablet, Take 1 tablet by mouth Daily., Disp: 90 tablet, Rfl: 1    glucose blood (Glucose Meter Test) test strip, check blood sugar every AM fasting and record. (Dx: E11.9), Disp: 100 each, Rfl: 3    Lancets (freestyle) lancets, Check blood sugar every morning fasting and record., Disp: 100 each, Rfl: 1    lisinopril (PRINIVIL,ZESTRIL) 20 MG tablet, Take 1 tablet by mouth 2 (Two) Times a Day., Disp: 180 tablet, Rfl: 1    rosuvastatin (CRESTOR) 40 MG tablet, Take 1 tablet by mouth Daily., Disp: 90 tablet, Rfl: 1    Semaglutide,0.25 or 0.5MG/DOS, (Ozempic, 0.25 or 0.5 MG/DOSE,) 2 MG/1.5ML solution pen-injector, Inject 0.5 mg under the skin into the appropriate area as directed 1 (One) Time Per Week., Disp: 4.5 mL, Rfl: 5    Synthroid 112 MCG tablet, Take 1 tablet by mouth Daily., Disp: 90 tablet, Rfl: 1    No Known Allergies     Family History   Problem Relation Age of Onset    No Known Problems Mother     No Known Problems Father     No Known Problems Sister     No Known Problems Brother     No Known Problems Son     No Known Problems Daughter     No Known Problems Maternal Grandmother     No Known Problems Maternal Grandfather     No Known Problems Paternal Grandmother     No Known Problems Paternal Grandfather     No Known Problems Cousin     No Known Problems Other     Rheum arthritis Neg Hx     Osteoarthritis Neg Hx     Asthma Neg Hx     Diabetes Neg Hx     Heart failure Neg Hx     Hyperlipidemia Neg Hx     Hypertension Neg Hx     Migraines Neg Hx     Rashes / Skin problems Neg Hx     Seizures Neg Hx     Stroke Neg Hx     Thyroid disease Neg Hx        Social History     Socioeconomic History    Marital status:    Tobacco Use    Smoking status: Never     Passive exposure: Never    Smokeless tobacco: Never   Vaping Use    Vaping status: Never Used   Substance and Sexual Activity    Alcohol use: Never    Drug use: Never    Sexual  "activity: Defer       Vital Signs:   Ht 162.6 cm (64.02\")   Wt 72.6 kg (160 lb)   BMI 27.45 kg/m²    Review of Systems    Physical Exam  Vitals and nursing note reviewed.   Constitutional:       Appearance: Normal appearance.   HENT:      Head: Normocephalic and atraumatic.   Eyes:      Extraocular Movements: Extraocular movements intact.      Pupils: Pupils are equal, round, and reactive to light.   Cardiovascular:      Pulses: Normal pulses.   Pulmonary:      Effort: Pulmonary effort is normal. No accessory muscle usage or respiratory distress.   Chest:   Breasts:     Right: Normal. No inverted nipple, mass, nipple discharge or skin change.      Left: Normal. No inverted nipple, mass, nipple discharge or skin change.      Comments: No distinct palpable mass in area of concern, she has some fullness in both axilla but no clinically suspicious nodes   Abdominal:      General: Abdomen is flat.      Palpations: Abdomen is soft.      Tenderness: There is no abdominal tenderness. There is no guarding.   Musculoskeletal:         General: No swelling, tenderness or deformity.      Cervical back: Neck supple.   Lymphadenopathy:      Upper Body:      Right upper body: No supraclavicular or axillary adenopathy.      Left upper body: No supraclavicular or axillary adenopathy.   Skin:     General: Skin is warm and dry.   Neurological:      General: No focal deficit present.      Mental Status: She is alert and oriented to person, place, and time.   Psychiatric:         Mood and Affect: Mood normal.         Thought Content: Thought content normal.          Result Review :               Assessment and Plan    Diagnoses and all orders for this visit:    1. Adenopathy (Primary)  -     US Axilla Left; Future    Will check left chest/axilla ultrasound and call her with results    Follow Up   Return for Followup after imaging study complete.  Patient was given instructions and counseling regarding her condition or for health " maintenance advice. Please see specific information pulled into the AVS if appropriate.         This document has been electronically signed by Crystal Clark MD  December 11, 2024 11:41 EST

## 2024-12-26 ENCOUNTER — HOSPITAL ENCOUNTER (OUTPATIENT)
Dept: ULTRASOUND IMAGING | Facility: HOSPITAL | Age: 59
Discharge: HOME | End: 2024-12-26
Admitting: SURGERY
Payer: OTHER GOVERNMENT

## 2024-12-26 ENCOUNTER — OFFICE VISIT (OUTPATIENT)
Dept: FAMILY MEDICINE CLINIC | Facility: CLINIC | Age: 59
End: 2024-12-26
Payer: OTHER GOVERNMENT

## 2024-12-26 VITALS
DIASTOLIC BLOOD PRESSURE: 98 MMHG | HEART RATE: 98 BPM | OXYGEN SATURATION: 98 % | TEMPERATURE: 98.2 F | HEIGHT: 64 IN | SYSTOLIC BLOOD PRESSURE: 153 MMHG | WEIGHT: 160.8 LBS | BODY MASS INDEX: 27.45 KG/M2

## 2024-12-26 DIAGNOSIS — R59.9 ADENOPATHY: ICD-10-CM

## 2024-12-26 DIAGNOSIS — Z00.00 ANNUAL PHYSICAL EXAM: Primary | ICD-10-CM

## 2024-12-26 DIAGNOSIS — E11.65 TYPE 2 DIABETES MELLITUS WITH HYPERGLYCEMIA, WITHOUT LONG-TERM CURRENT USE OF INSULIN: ICD-10-CM

## 2024-12-26 DIAGNOSIS — Z12.39 ENCOUNTER FOR SCREENING FOR MALIGNANT NEOPLASM OF BREAST, UNSPECIFIED SCREENING MODALITY: ICD-10-CM

## 2024-12-26 DIAGNOSIS — E78.00 PURE HYPERCHOLESTEROLEMIA: ICD-10-CM

## 2024-12-26 DIAGNOSIS — I10 ESSENTIAL HYPERTENSION: ICD-10-CM

## 2024-12-26 DIAGNOSIS — Z00.00 ENCOUNTER FOR ANNUAL PHYSICAL EXAM: ICD-10-CM

## 2024-12-26 PROCEDURE — 87624 HPV HI-RISK TYP POOLED RSLT: CPT | Performed by: STUDENT IN AN ORGANIZED HEALTH CARE EDUCATION/TRAINING PROGRAM

## 2024-12-26 PROCEDURE — G0123 SCREEN CERV/VAG THIN LAYER: HCPCS | Performed by: STUDENT IN AN ORGANIZED HEALTH CARE EDUCATION/TRAINING PROGRAM

## 2024-12-26 PROCEDURE — 76882 US LMTD JT/FCL EVL NVASC XTR: CPT

## 2024-12-26 PROCEDURE — 99396 PREV VISIT EST AGE 40-64: CPT | Performed by: STUDENT IN AN ORGANIZED HEALTH CARE EDUCATION/TRAINING PROGRAM

## 2024-12-26 NOTE — PROGRESS NOTES
Adult Female Preventive Health Visit  DOS: 24    Patient: Lynn Stoner  :  1965  Age: 59 y.o.  Gender: female   MRN: [id number]    Chief Complaint:   Annual Health Maintenance  Gynecologic Exam    Subjective   Patient is here for annual physical. Other complaints are type 2 diabetes, hypertension and hyperlipidemia    History of Present Illness  The patient presents for her annual Pap smear and well-woman exam.    She is here for her annual Pap smear and well-woman exam. She is under the primary care of Dr. Coto. Her last mammogram was conducted in 2024, and she is seeking a referral for her next one. She has no family history of breast cancer or other types of cancers. She had an ultrasound today because she found a small bump on her breast during the  weekend. She maintains regular dental check-ups. She underwent a colonoscopy last year. She reports no abnormal vaginal discharge, irritation, itching, or abdominal pain. She is not on metformin. She declines the pneumonia vaccine today. She is on medication for blood pressure but did not take it today or last night.  Her blood pressure today is 150/98.    She has type 2 diabetes and is currently on Ozempic injections at a dose of 0.5 mg/wk       Allergies:   No Known Allergies  Medications:  Current Outpatient Medications on File Prior to Visit   Medication Sig Dispense Refill    amLODIPine (NORVASC) 10 MG tablet Take 1 tablet by mouth Daily. 90 tablet 1    ezetimibe (Zetia) 10 MG tablet Take 1 tablet by mouth Daily. 90 tablet 1    glucose blood (Glucose Meter Test) test strip check blood sugar every AM fasting and record. (Dx: E11.9) 100 each 3    Lancets (freestyle) lancets Check blood sugar every morning fasting and record. 100 each 1    lisinopril (PRINIVIL,ZESTRIL) 20 MG tablet Take 1 tablet by mouth 2 (Two) Times a Day. 180 tablet 1    rosuvastatin (CRESTOR) 40 MG tablet Take 1 tablet by mouth Daily. 90 tablet 1     Semaglutide,0.25 or 0.5MG/DOS, (Ozempic, 0.25 or 0.5 MG/DOSE,) 2 MG/1.5ML solution pen-injector Inject 0.5 mg under the skin into the appropriate area as directed 1 (One) Time Per Week. 4.5 mL 5    Synthroid 112 MCG tablet Take 1 tablet by mouth Daily. 90 tablet 1     No current facility-administered medications on file prior to visit.      I have reviewed and updated as appropriate the past medical, surgical, family, and social history as summarized below:  Past Medical, Social and Family History:     Past Medical History:   Diagnosis Date    Hyperlipidemia     Hypertension     Hypothyroid      Social History     Tobacco Use    Smoking status: Never     Passive exposure: Never    Smokeless tobacco: Never   Substance Use Topics    Alcohol use: Never     Family History   Problem Relation Age of Onset    No Known Problems Mother     No Known Problems Father     No Known Problems Sister     No Known Problems Brother     No Known Problems Son     No Known Problems Daughter     No Known Problems Maternal Grandmother     No Known Problems Maternal Grandfather     No Known Problems Paternal Grandmother     No Known Problems Paternal Grandfather     No Known Problems Cousin     No Known Problems Other     Rheum arthritis Neg Hx     Osteoarthritis Neg Hx     Asthma Neg Hx     Diabetes Neg Hx     Heart failure Neg Hx     Hyperlipidemia Neg Hx     Hypertension Neg Hx     Migraines Neg Hx     Rashes / Skin problems Neg Hx     Seizures Neg Hx     Stroke Neg Hx     Thyroid disease Neg Hx      Immunization History   Administered Date(s) Administered    COVID-19 (MODERNA) 1st,2nd,3rd Dose Monovalent 08/23/2021, 09/28/2021    Tdap 07/23/2024       The following data was reviewed by: Susie Moya MD on 12/26/2024:  CMP          4/17/2024    17:25 7/23/2024    14:59   CMP   Glucose 135  83    BUN 10  10    Creatinine 0.96  0.63    EGFR 69.2  103.6    Sodium 138  137    Potassium 4.1  3.7    Chloride 102  99    Calcium 9.2  9.5   "  Total Protein 8.2  8.1    Albumin 4.5  4.4    Globulin 3.7  3.7    Total Bilirubin 0.5  0.3    Alkaline Phosphatase 83  83    AST (SGOT) 13  15    ALT (SGPT) 19  11    Albumin/Globulin Ratio 1.2  1.2    BUN/Creatinine Ratio 10.4  15.9    Anion Gap 9.0  13.1        TSH          4/17/2024    17:25 7/23/2024    14:59   TSH   TSH 2.950  1.520         Objective   Vital Signs:   Vitals:    12/26/24 1401   BP: 153/98   BP Location: Left arm   Patient Position: Sitting   Cuff Size: Adult   Pulse: 98   Temp: 98.2 °F (36.8 °C)   TempSrc: Temporal   SpO2: 98%   Weight: 72.9 kg (160 lb 12.8 oz)   Height: 162.6 cm (64.02\")     Body mass index is 27.59 kg/m².    Wt Readings from Last 3 Encounters:   12/26/24 72.9 kg (160 lb 12.8 oz)   12/11/24 72.6 kg (160 lb)   11/30/24 72.5 kg (159 lb 13.3 oz)     BP Readings from Last 3 Encounters:   12/26/24 153/98   11/30/24 147/69   07/23/24 130/75       Review of Systems   Constitutional:  Negative for activity change and appetite change.   Respiratory:  Negative for cough and shortness of breath.    Cardiovascular:  Negative for chest pain and palpitations.   Neurological:  Negative for dizziness and light-headedness.       Physical Exam  Constitutional:       Appearance: Normal appearance.   HENT:      Head: Normocephalic and atraumatic.      Mouth/Throat:      Mouth: Mucous membranes are moist.   Eyes:      Pupils: Pupils are equal, round, and reactive to light.   Cardiovascular:      Rate and Rhythm: Normal rate and regular rhythm.      Pulses: Normal pulses.      Heart sounds: Normal heart sounds.   Pulmonary:      Effort: Pulmonary effort is normal.      Breath sounds: Normal breath sounds.   Chest:      Comments: Unremarkable bilateral breast exam  Abdominal:      Palpations: Abdomen is soft.   Musculoskeletal:         General: Normal range of motion.   Skin:     General: Skin is warm.   Neurological:      General: No focal deficit present.      Mental Status: She is alert and " oriented to person, place, and time.   Psychiatric:         Mood and Affect: Mood normal.         Behavior: Behavior normal.         Physical Exam  Lungs were auscultated.  Heart was examined.  Pap smear was performed.    Vital Signs  Weight is normal. Blood pressure is slightly elevated.       Health Maintenance   Topic Date Due    DIABETIC EYE EXAM  Never done    HEMOGLOBIN A1C  01/23/2025    ZOSTER VACCINE (1 of 2) 12/26/2024 (Originally 1/21/2017)    COVID-19 Vaccine (3 - 2024-25 season) 12/28/2024 (Originally 9/1/2024)    INFLUENZA VACCINE  03/31/2025 (Originally 7/1/2024)    Pneumococcal Vaccine 0-64 (1 of 2 - PCV) 12/26/2025 (Originally 1/21/1971)    DIABETIC FOOT EXAM  07/23/2025    LIPID PANEL  07/23/2025    BMI FOLLOWUP  07/23/2025    ANNUAL PHYSICAL  12/26/2025    MAMMOGRAM  03/25/2026    PAP SMEAR  12/18/2026    COLORECTAL CANCER SCREENING  03/30/2028    TDAP/TD VACCINES (2 - Td or Tdap) 07/23/2034    HEPATITIS C SCREENING  Completed    Hepatitis B  Discontinued       Lifestyle:  Marital Status:   Household members: spouse   Work Status: employed  Weight Concerns: No  Balanced diet: Yes  Regular Exercise: Yes      Fall Risk  STEADI Fall Risk Assessment has not been completed.    Social Screening Questions:  Lynn Stoner  reports that she has never smoked. She has never been exposed to tobacco smoke. She has never used smokeless tobacco.    Drug Use: denied.  Alcohol Use: none  Sexually Active: Yes  Sexual Preference: heterosexual  Contraception:  no method    PHQ-9 Depression Screening  Little interest or pleasure in doing things? Not at all   Feeling down, depressed, or hopeless? Not at all   PHQ-2 Total Score 0   Trouble falling or staying asleep, or sleeping too much?     Feeling tired or having little energy?     Poor appetite or overeating?     Feeling bad about yourself - or that you are a failure or have let yourself or your family down?     Trouble concentrating on things, such as  reading the newspaper or watching television?     Moving or speaking so slowly that other people could have noticed? Or the opposite - being so fidgety or restless that you have been moving around a lot more than usual?     Thoughts that you would be better off dead, or of hurting yourself in some way?     PHQ-9 Total Score     If you checked off any problems, how difficult have these problems made it for you to do your work, take care of things at home, or get along with other people?         Vision/Hearing/Dental  Regular Dental Visits: Yes  Vision Problems: No  Hearing Loss: No    Health Screening  Breast Cancer Screening:   Last Completed Mammogram            Ordered - MAMMOGRAM (Every 2 Years) Ordered on 12/26/2024 03/25/2024  Mammo Screening Digital Tomosynthesis Bilateral With CAD    03/22/2023  Mammo Screening Digital Tomosynthesis Bilateral With CAD    03/17/2022  Mammo Screening Digital Tomosynthesis Bilateral With CAD    03/12/2021  Mammo Screening Digital Tomosynthesis Bilateral With CAD    02/04/2020  Mammo Screening Digital Tomosynthesis Bilateral With CAD    Only the first 5 history entries have been loaded, but more history exists.                  Pap Smear:   Last Completed Pap Smear            Ordered - PAP SMEAR (Every 3 Years) Ordered on 12/26/2024 12/18/2023  IgP, Aptima HPV    12/14/2022  IgP, Aptima HPV    11/15/2021  IgP, Aptima HPV    10/25/2019  Outside Procedure: AL CYTOPATH CERV/VAG THIN LAYER                  Colon Cancer Screening:   Last Completed Colonoscopy            COLORECTAL CANCER SCREENING (COLONOSCOPY - Every 5 Years) Tentatively due on 3/30/2028      03/30/2023  COLONOSCOPY    03/30/2023  Surgical Procedure: COLONOSCOPY    12/29/2017  COLONOSCOPY (Done)    12/29/2017  SCANNED - COLONOSCOPY                  Breast cancer screening: mammogram   Pap smear:done today  Colon Screening Methold: Colonoscopy  Last DEXA: N/A  Lung Cancer Screening: N/A    Reproductive  Health  Pregnancy History:    2   Para 2   AB Spont 0   AB Elective 0  Menstrual history: postmenopausal    Safety  Smoke Detectors in Home: Yes  Carbon Monoxide Detectors in Home: Yes  Guns in Home: No  Seatbelt use: Yes  Sunscreen Use: Yes    Results  Laboratory Studies  A1c is in the diabetic range.       Assessment   Diagnoses and all orders for this visit:    1. Annual physical exam (Primary)  -     Hemoglobin A1c; Future    2. Encounter for annual physical exam  -     IGP, Aptima HPV, Rfx 16 / 18,45; Future  -     IGP, Aptima HPV, Rfx 16 / 18,45    3. Type 2 diabetes mellitus with hyperglycemia, without long-term current use of insulin  -     Hemoglobin A1c; Future  -     Microalbumin / Creatinine Urine Ratio - Urine, Clean Catch  -     Ambulatory Referral for Diabetic Eye Exam-Ophthalmology    4. Encounter for screening for malignant neoplasm of breast, unspecified screening modality  -     Mammo Screening Digital Tomosynthesis Bilateral With CAD; Future    5. Pure hypercholesterolemia    6. Essential hypertension        Assessment & Plan  1. Health maintenance.  Her weight is within the normal range. Blood pressure is slightly elevated today, likely due to missed medication, advised to take medication consistently, DASH diet and daily exercises.She is due for a mammogram, which she undergoes annually. Her lipid panel remains stable. She is due for colon cancer screening in . She has been advised to receive the shingles vaccine at a pharmacy of her choice. She has been advised to take her blood pressure medication tonight and continue her regimen as prescribed. A mammogram has been ordered. An A1c test and urine test have been ordered. A diabetic eye exam has been ordered.    2. Type 2 Diabetes Mellitus.  Her A1c is in the diabetic range. She is currently on Ozempic. She will continue with the current dosage as per her primary care physician's recommendation. A diabetic eye exam has been ordered to  check for any retinal damage caused by diabetes.    PROCEDURE  Colonoscopy was performed last year.        Return if symptoms worsen or fail to improve.    Patient Care Team:  Jerry Coto DO as PCP - General (Family Medicine)  Rodrick April, APRN as Nurse Practitioner (Nurse Practitioner)    Electronically signed by Susie Moya MD, 12/26/24, 2:52 PM EST.    Patient or patient representative verbalized consent for the use of Ambient Listening during the visit with  Susie Moya MD for chart documentation. 12/26/2024  14:58 EST

## 2024-12-31 LAB
CYTOLOGIST CVX/VAG CYTO: NORMAL
CYTOLOGY CVX/VAG DOC CYTO: NORMAL
CYTOLOGY CVX/VAG DOC THIN PREP: NORMAL
DX ICD CODE: NORMAL
HPV GENOTYPE REFLEX: NORMAL
HPV I/H RISK 4 DNA CVX QL PROBE+SIG AMP: NEGATIVE
Lab: NORMAL
OTHER STN SPEC: NORMAL
STAT OF ADQ CVX/VAG CYTO-IMP: NORMAL

## 2025-01-02 ENCOUNTER — TELEPHONE (OUTPATIENT)
Dept: SURGERY | Facility: CLINIC | Age: 60
End: 2025-01-02
Payer: OTHER GOVERNMENT

## 2025-01-23 ENCOUNTER — LAB (OUTPATIENT)
Dept: LAB | Facility: HOSPITAL | Age: 60
End: 2025-01-23
Payer: OTHER GOVERNMENT

## 2025-01-23 ENCOUNTER — TELEPHONE (OUTPATIENT)
Dept: FAMILY MEDICINE CLINIC | Facility: CLINIC | Age: 60
End: 2025-01-23

## 2025-01-23 DIAGNOSIS — Z00.00 ANNUAL PHYSICAL EXAM: ICD-10-CM

## 2025-01-23 DIAGNOSIS — E11.65 TYPE 2 DIABETES MELLITUS WITH HYPERGLYCEMIA, WITHOUT LONG-TERM CURRENT USE OF INSULIN: ICD-10-CM

## 2025-01-23 LAB — HBA1C MFR BLD: 6.6 % (ref 4.8–5.6)

## 2025-01-23 PROCEDURE — 83036 HEMOGLOBIN GLYCOSYLATED A1C: CPT

## 2025-02-25 ENCOUNTER — OFFICE VISIT (OUTPATIENT)
Dept: FAMILY MEDICINE CLINIC | Facility: CLINIC | Age: 60
End: 2025-02-25
Payer: OTHER GOVERNMENT

## 2025-02-25 VITALS
WEIGHT: 161 LBS | HEART RATE: 92 BPM | HEIGHT: 64 IN | DIASTOLIC BLOOD PRESSURE: 86 MMHG | OXYGEN SATURATION: 98 % | BODY MASS INDEX: 27.49 KG/M2 | TEMPERATURE: 97 F | SYSTOLIC BLOOD PRESSURE: 140 MMHG

## 2025-02-25 DIAGNOSIS — E55.9 VITAMIN D DEFICIENCY: ICD-10-CM

## 2025-02-25 DIAGNOSIS — E11.9 TYPE 2 DIABETES MELLITUS WITHOUT COMPLICATION, WITHOUT LONG-TERM CURRENT USE OF INSULIN: ICD-10-CM

## 2025-02-25 DIAGNOSIS — E03.9 ACQUIRED HYPOTHYROIDISM: ICD-10-CM

## 2025-02-25 DIAGNOSIS — E78.00 PURE HYPERCHOLESTEROLEMIA: ICD-10-CM

## 2025-02-25 DIAGNOSIS — I10 ESSENTIAL HYPERTENSION: Primary | ICD-10-CM

## 2025-02-25 PROCEDURE — 99214 OFFICE O/P EST MOD 30 MIN: CPT | Performed by: FAMILY MEDICINE

## 2025-02-25 RX ORDER — AMLODIPINE BESYLATE 10 MG/1
10 TABLET ORAL DAILY
Qty: 90 TABLET | Refills: 1 | Status: SHIPPED | OUTPATIENT
Start: 2025-02-25

## 2025-02-25 RX ORDER — LEVOTHYROXINE SODIUM 112 MCG
112 TABLET ORAL DAILY
Qty: 90 TABLET | Refills: 1 | Status: SHIPPED | OUTPATIENT
Start: 2025-02-25

## 2025-02-25 RX ORDER — ROSUVASTATIN CALCIUM 40 MG/1
40 TABLET, COATED ORAL DAILY
Qty: 90 TABLET | Refills: 1 | Status: SHIPPED | OUTPATIENT
Start: 2025-02-25

## 2025-02-25 RX ORDER — LISINOPRIL 20 MG/1
20 TABLET ORAL 2 TIMES DAILY
Qty: 180 TABLET | Refills: 1 | Status: SHIPPED | OUTPATIENT
Start: 2025-02-25

## 2025-02-25 NOTE — ASSESSMENT & PLAN NOTE
Her blood pressure is rather elevated here today.  Will recheck it 1 more time.  Upon recheck her blood pressure is much improved.  It was just a little borderline here today.

## 2025-02-25 NOTE — PROGRESS NOTES
Chief Complaint   Patient presents with    Follow-up      6 month / Pure hypercholesterolemia        Subjective     Lynn Stoner  has a past medical history of Hyperlipidemia.    Type 2 diabetes-she is doing well.  She states most the time her blood sugars are under 115 but occasionally a little bit higher.  No blurred vision excessive thirst or excessive urination.  She does watch an attempt to moderate her carbohydrate intake.    Hypertension-she has been checking her blood pressure at home with a wrist cuff.  She states those numbers have been erratic.  Her blood pressure is elevated here today at 161/102.  She is taking her amlodipine and lisinopril daily.    Hyperlipidemia-she does take her rosuvastatin daily.  She stopped the Zetia as she felt it was causing a headache.    Hypothyroid-she takes her levothyroxine every morning as directed.        PHQ-2 Depression Screening  Little interest or pleasure in doing things?     Feeling down, depressed, or hopeless?     PHQ-2 Total Score     PHQ-9 Depression Screening  Little interest or pleasure in doing things?     Feeling down, depressed, or hopeless?     Trouble falling or staying asleep, or sleeping too much?     Feeling tired or having little energy?     Poor appetite or overeating?     Feeling bad about yourself - or that you are a failure or have let yourself or your family down?     Trouble concentrating on things, such as reading the newspaper or watching television?     Moving or speaking so slowly that other people could have noticed? Or the opposite - being so fidgety or restless that you have been moving around a lot more than usual?     Thoughts that you would be better off dead, or of hurting yourself in some way?     PHQ-9 Total Score     If you checked off any problems, how difficult have these problems made it for you to do your work, take care of things at home, or get along with other people?       No Known Allergies    Prior to Admission  medications    Medication Sig Start Date End Date Taking? Authorizing Provider   amLODIPine (NORVASC) 10 MG tablet Take 1 tablet by mouth Daily. 7/23/24  Yes Jerry Coto DO   ezetimibe (Zetia) 10 MG tablet Take 1 tablet by mouth Daily. 7/23/24  Yes Jerry Coto DO   glucose blood (Glucose Meter Test) test strip check blood sugar every AM fasting and record. (Dx: E11.9) 7/23/24  Yes Jerry Coto DO   Lancets (freestyle) lancets Check blood sugar every morning fasting and record. 7/23/24  Yes Jerry Coto DO   lisinopril (PRINIVIL,ZESTRIL) 20 MG tablet Take 1 tablet by mouth 2 (Two) Times a Day. 7/23/24  Yes Jerry Coto DO   rosuvastatin (CRESTOR) 40 MG tablet Take 1 tablet by mouth Daily. 7/23/24  Yes Jerry Coto DO   Semaglutide,0.25 or 0.5MG/DOS, (Ozempic, 0.25 or 0.5 MG/DOSE,) 2 MG/1.5ML solution pen-injector Inject 0.5 mg under the skin into the appropriate area as directed 1 (One) Time Per Week. 7/23/24  Yes Jerry Coto DO   Synthroid 112 MCG tablet Take 1 tablet by mouth Daily. 7/23/24  Yes Jerry Coto DO        Patient Active Problem List   Diagnosis    Pap smear for cervical cancer screening    Acquired hypothyroidism    Diabetes mellitus, type 2    Essential hypertension    Post-menopause    Vitamin D deficiency    Generalized anxiety disorder    Chronic pain of left knee    History of colonic polyps    Hyperlipidemia    Chronic right shoulder pain    Adenopathy        Past Surgical History:   Procedure Laterality Date    COLONOSCOPY      COLONOSCOPY N/A 3/30/2023    Procedure: COLONOSCOPY WITH POLYPECTOMY/BIOPSY;  Surgeon: Edd Marcelo MD;  Location: [facility] ENDOSCOPY;  Service: General;  Laterality: N/A;  COLON POLYP    ENDOMETRIAL ABLATION W/ FRANSICO      LEEP         Social History     Socioeconomic History    Marital status:    Tobacco Use    Smoking status: Never     Passive exposure: Never  "   Smokeless tobacco: Never   Vaping Use    Vaping status: Never Used   Substance and Sexual Activity    Alcohol use: Never    Drug use: Never    Sexual activity: Defer       Family History   Problem Relation Age of Onset    No Known Problems Mother     No Known Problems Father     No Known Problems Sister     No Known Problems Brother     No Known Problems Son     No Known Problems Daughter     No Known Problems Maternal Grandmother     No Known Problems Maternal Grandfather     No Known Problems Paternal Grandmother     No Known Problems Paternal Grandfather     No Known Problems Cousin     No Known Problems Other     Rheum arthritis Neg Hx     Osteoarthritis Neg Hx     Asthma Neg Hx     Diabetes Neg Hx     Heart failure Neg Hx     Hyperlipidemia Neg Hx     Hypertension Neg Hx     Migraines Neg Hx     Rashes / Skin problems Neg Hx     Seizures Neg Hx     Stroke Neg Hx     Thyroid disease Neg Hx        Family history, surgical history, past medical history, Allergies and meds reviewed with patient today and updated in [facility] EMR.     ROS:  Review of Systems   Constitutional:  Negative for fatigue.   HENT:  Negative for congestion, postnasal drip and rhinorrhea.    Eyes:  Negative for blurred vision and visual disturbance.   Respiratory:  Negative for cough, chest tightness, shortness of breath and wheezing.    Cardiovascular:  Negative for chest pain and palpitations.   Endocrine: Negative for polydipsia and polyuria.   Allergic/Immunologic: Negative for environmental allergies.   Neurological:  Negative for headache.   Psychiatric/Behavioral:  Negative for depressed mood. The patient is not nervous/anxious.        OBJECTIVE:  Vitals:    02/25/25 1515 02/25/25 1607   BP: (!) 161/102 140/86   BP Location: Left arm Left arm   Patient Position: Sitting Sitting   Cuff Size:  Adult   Pulse: 92    Temp: 97 °F (36.1 °C)    SpO2: 98%    Weight: 73 kg (161 lb)    Height: 162.6 cm (64.02\")      No results found.   Body mass " index is 27.62 kg/m².  No LMP recorded (lmp unknown). Patient is postmenopausal.    The 10-year ASCVD risk score (Mary Ann ALICEA, et al., 2019) is: 19.1%    Values used to calculate the score:      Age: 60 years      Sex: Female      Is Non- : Yes      Diabetic: Yes      Tobacco smoker: No      Systolic Blood Pressure: 140 mmHg      Is BP treated: Yes      HDL Cholesterol: 53 mg/dL      Total Cholesterol: 210 mg/dL     Physical Exam  Vitals and nursing note reviewed.   Constitutional:       General: She is not in acute distress.     Appearance: Normal appearance. She is normal weight.   HENT:      Head: Normocephalic.      Right Ear: Tympanic membrane, ear canal and external ear normal.      Left Ear: Tympanic membrane, ear canal and external ear normal.      Nose: Nose normal.      Mouth/Throat:      Mouth: Mucous membranes are moist.      Pharynx: Oropharynx is clear.   Eyes:      General: No scleral icterus.     Conjunctiva/sclera: Conjunctivae normal.      Pupils: Pupils are equal, round, and reactive to light.   Cardiovascular:      Rate and Rhythm: Normal rate and regular rhythm.      Pulses: Normal pulses.      Heart sounds: Normal heart sounds. No murmur heard.  Pulmonary:      Effort: Pulmonary effort is normal.      Breath sounds: Normal breath sounds. No wheezing, rhonchi or rales.   Musculoskeletal:      Cervical back: Neck supple. No rigidity or tenderness.   Lymphadenopathy:      Cervical: No cervical adenopathy.   Skin:     General: Skin is warm and dry.      Coloration: Skin is not jaundiced.      Findings: No rash.   Neurological:      General: No focal deficit present.      Mental Status: She is alert and oriented to person, place, and time.   Psychiatric:         Mood and Affect: Mood normal.         Thought Content: Thought content normal.         Judgment: Judgment normal.         Procedures    No visits with results within 30 Day(s) from this visit.   Latest known visit with  results is:   Lab on 01/23/2025   Component Date Value Ref Range Status    Hemoglobin A1C 01/23/2025 6.60 (H)  4.80 - 5.60 % Final       ASSESSMENT/ PLAN:    Diagnoses and all orders for this visit:    1. Essential hypertension (Primary)  Assessment & Plan:  Her blood pressure is rather elevated here today.  Will recheck it 1 more time.  Upon recheck her blood pressure is much improved.  It was just a little borderline here today.    Orders:  -     Comprehensive Metabolic Panel  -     Lipid Panel    2. Pure hypercholesterolemia  Assessment & Plan:   Will update her lipid profile    Orders:  -     Comprehensive Metabolic Panel  -     Lipid Panel    3. Type 2 diabetes mellitus without complication, without long-term current use of insulin  Assessment & Plan:  She had her A1c done just about a month ago.  That was very good at 6.5.  She feels ever since she has been on the Ozempic as it made her feel somewhat lethargic and unmotivated.  Will try switching to Mounjaro and see if she tolerates it better.      4. Acquired hypothyroidism  Assessment & Plan:  Will update her TSH with her routine labs here today.    Orders:  -     TSH Rfx On Abnormal To Free T4    5. Vitamin D deficiency  -     Vitamin D,25-Hydroxy    Other orders  -     Tirzepatide 2.5 MG/0.5ML solution auto-injector; Inject 2.5 mg under the skin into the appropriate area as directed 1 (One) Time Per Week.  Dispense: 2 mL; Refill: 0  -     Tirzepatide 5 MG/0.5ML solution auto-injector; Inject 5 mg under the skin into the appropriate area as directed 1 (One) Time Per Week.  Dispense: 2 mL; Refill: 5  -     Synthroid 112 MCG tablet; Take 1 tablet by mouth Daily.  Dispense: 90 tablet; Refill: 1  -     amLODIPine (NORVASC) 10 MG tablet; Take 1 tablet by mouth Daily.  Dispense: 90 tablet; Refill: 1  -     lisinopril (PRINIVIL,ZESTRIL) 20 MG tablet; Take 1 tablet by mouth 2 (Two) Times a Day.  Dispense: 180 tablet; Refill: 1  -     rosuvastatin (CRESTOR) 40 MG  tablet; Take 1 tablet by mouth Daily.  Dispense: 90 tablet; Refill: 1               Orders Placed Today:     New Medications Ordered This Visit   Medications    Tirzepatide 2.5 MG/0.5ML solution auto-injector     Sig: Inject 2.5 mg under the skin into the appropriate area as directed 1 (One) Time Per Week.     Dispense:  2 mL     Refill:  0     Month #1    Tirzepatide 5 MG/0.5ML solution auto-injector     Sig: Inject 5 mg under the skin into the appropriate area as directed 1 (One) Time Per Week.     Dispense:  2 mL     Refill:  5     Start month #2    Synthroid 112 MCG tablet     Sig: Take 1 tablet by mouth Daily.     Dispense:  90 tablet     Refill:  1    amLODIPine (NORVASC) 10 MG tablet     Sig: Take 1 tablet by mouth Daily.     Dispense:  90 tablet     Refill:  1    lisinopril (PRINIVIL,ZESTRIL) 20 MG tablet     Sig: Take 1 tablet by mouth 2 (Two) Times a Day.     Dispense:  180 tablet     Refill:  1    rosuvastatin (CRESTOR) 40 MG tablet     Sig: Take 1 tablet by mouth Daily.     Dispense:  90 tablet     Refill:  1        Management Plan:     An After Visit Summary was printed and given to the patient at discharge.    Follow-up: Return in about 3 months (around 5/25/2025) for Recheck.    Jerry Coto DO 2/25/2025 16:12 EST  This note was electronically signed.

## 2025-02-25 NOTE — ASSESSMENT & PLAN NOTE
She had her A1c done just about a month ago.  That was very good at 6.5.  She feels ever since she has been on the Ozempic as it made her feel somewhat lethargic and unmotivated.  Will try switching to Mounjaro and see if she tolerates it better.

## 2025-03-03 ENCOUNTER — TELEPHONE (OUTPATIENT)
Dept: FAMILY MEDICINE CLINIC | Facility: CLINIC | Age: 60
End: 2025-03-03

## 2025-03-03 NOTE — TELEPHONE ENCOUNTER
Caller: Lynn Stoner    Relationship to patient: Self    Best call back number: [phone]     Patient is needing: PATIENT STATES THAT A PRIO AUTHORIZATION IS NEEDED FOR HER     Tirzepatide 5 MG/0.5ML solution auto-injector       PATIENT STATES THAT SHE WOULD LIKE A CALL BACK WHEN THIS IS SENT

## 2025-03-26 ENCOUNTER — HOSPITAL ENCOUNTER (OUTPATIENT)
Dept: MAMMOGRAPHY | Facility: HOSPITAL | Age: 60
Discharge: HOME | End: 2025-03-26
Admitting: STUDENT IN AN ORGANIZED HEALTH CARE EDUCATION/TRAINING PROGRAM
Payer: OTHER GOVERNMENT

## 2025-03-26 DIAGNOSIS — Z12.39 ENCOUNTER FOR SCREENING FOR MALIGNANT NEOPLASM OF BREAST, UNSPECIFIED SCREENING MODALITY: ICD-10-CM

## 2025-03-26 PROCEDURE — 77067 SCR MAMMO BI INCL CAD: CPT

## 2025-03-26 PROCEDURE — 77063 BREAST TOMOSYNTHESIS BI: CPT

## 2025-04-08 ENCOUNTER — TELEPHONE (OUTPATIENT)
Dept: FAMILY MEDICINE CLINIC | Facility: CLINIC | Age: 60
End: 2025-04-08
Payer: OTHER GOVERNMENT

## 2025-04-08 NOTE — TELEPHONE ENCOUNTER
Caller: Lynn Stoner    Relationship: Self    Best call back number: [phone]     Requested Prescriptions:     Tirzepatide 2.5 MG/0.5ML solution auto-injector     Requested Prescriptions      No prescriptions requested or ordered in this encounter        Pharmacy where request should be sent: [facility] [address] [phone] [facility] [phone]      Last office visit with prescribing clinician: 2/25/2025   Last telemedicine visit with prescribing clinician: Visit date not found   Next office visit with prescribing clinician: 5/23/2025     Additional details provided by patient: TOOK LAST SHOT 4/7/25    Does the patient have less than a 3 day supply:  [x] Yes  [] No    Would you like a call back once the refill request has been completed: [] Yes [] No    If the office needs to give you a call back, can they leave a voicemail: [] Yes [] No    Ras Espinal   04/08/25 11:37 EDT

## 2025-05-19 ENCOUNTER — LAB (OUTPATIENT)
Dept: LAB | Facility: HOSPITAL | Age: 60
End: 2025-05-19
Payer: OTHER GOVERNMENT

## 2025-05-19 LAB
25(OH)D3 SERPL-MCNC: 35.2 NG/ML (ref 30–100)
ALBUMIN SERPL-MCNC: 4.3 G/DL (ref 3.5–5.2)
ALBUMIN/GLOB SERPL: 1.2 G/DL
ALP SERPL-CCNC: 95 U/L (ref 39–117)
ALT SERPL W P-5'-P-CCNC: 12 U/L (ref 1–33)
ANION GAP SERPL CALCULATED.3IONS-SCNC: 8 MMOL/L (ref 5–15)
AST SERPL-CCNC: 16 U/L (ref 1–32)
BILIRUB SERPL-MCNC: 0.6 MG/DL (ref 0–1.2)
BUN SERPL-MCNC: 11 MG/DL (ref 6–20)
BUN/CREAT SERPL: 13.4 (ref 7–25)
CALCIUM SPEC-SCNC: 9.9 MG/DL (ref 8.6–10.5)
CHLORIDE SERPL-SCNC: 102 MMOL/L (ref 98–107)
CHOLEST SERPL-MCNC: 128 MG/DL (ref 0–200)
CO2 SERPL-SCNC: 27 MMOL/L (ref 22–29)
CREAT SERPL-MCNC: 0.82 MG/DL (ref 0.57–1)
EGFRCR SERPLBLD CKD-EPI 2021: 83 ML/MIN/1.73
GLOBULIN UR ELPH-MCNC: 3.6 GM/DL
GLUCOSE SERPL-MCNC: 102 MG/DL (ref 65–99)
HDLC SERPL-MCNC: 55 MG/DL (ref 40–60)
LDLC SERPL CALC-MCNC: 62 MG/DL (ref 0–100)
LDLC/HDLC SERPL: 1.16 {RATIO}
POTASSIUM SERPL-SCNC: 4.5 MMOL/L (ref 3.5–5.2)
PROT SERPL-MCNC: 7.9 G/DL (ref 6–8.5)
SODIUM SERPL-SCNC: 137 MMOL/L (ref 136–145)
T4 FREE SERPL-MCNC: 1.5 NG/DL (ref 0.92–1.68)
TRIGL SERPL-MCNC: 45 MG/DL (ref 0–150)
TSH SERPL DL<=0.05 MIU/L-ACNC: 8.34 UIU/ML (ref 0.27–4.2)
VLDLC SERPL-MCNC: 11 MG/DL (ref 5–40)

## 2025-05-19 PROCEDURE — 80061 LIPID PANEL: CPT | Performed by: FAMILY MEDICINE

## 2025-05-19 PROCEDURE — 80053 COMPREHEN METABOLIC PANEL: CPT | Performed by: FAMILY MEDICINE

## 2025-05-19 PROCEDURE — 84443 ASSAY THYROID STIM HORMONE: CPT | Performed by: FAMILY MEDICINE

## 2025-05-19 PROCEDURE — 84439 ASSAY OF FREE THYROXINE: CPT | Performed by: FAMILY MEDICINE

## 2025-05-19 PROCEDURE — 82306 VITAMIN D 25 HYDROXY: CPT | Performed by: FAMILY MEDICINE

## 2025-05-23 ENCOUNTER — OFFICE VISIT (OUTPATIENT)
Dept: FAMILY MEDICINE CLINIC | Facility: CLINIC | Age: 60
End: 2025-05-23
Payer: OTHER GOVERNMENT

## 2025-05-23 VITALS
OXYGEN SATURATION: 99 % | DIASTOLIC BLOOD PRESSURE: 79 MMHG | HEART RATE: 87 BPM | SYSTOLIC BLOOD PRESSURE: 135 MMHG | WEIGHT: 157 LBS | TEMPERATURE: 97.8 F | BODY MASS INDEX: 26.8 KG/M2 | HEIGHT: 64 IN

## 2025-05-23 DIAGNOSIS — E78.00 PURE HYPERCHOLESTEROLEMIA: Primary | ICD-10-CM

## 2025-05-23 DIAGNOSIS — E03.9 ACQUIRED HYPOTHYROIDISM: ICD-10-CM

## 2025-05-23 DIAGNOSIS — I10 ESSENTIAL HYPERTENSION: ICD-10-CM

## 2025-05-23 DIAGNOSIS — E11.9 TYPE 2 DIABETES MELLITUS WITHOUT COMPLICATION, WITHOUT LONG-TERM CURRENT USE OF INSULIN: ICD-10-CM

## 2025-05-23 NOTE — PROGRESS NOTES
Chief Complaint   Patient presents with    Follow-up     3 month     Hypertension     Pure hypercholesterolemia    Diabetes    Hyperlipidemia        Subjective     Lynn Stoner  has a past medical history of Hyperlipidemia.    Hypothryroidism- She is taking her Synthroid every morning as directed. She dnies any fatigue, but has had an increased incidence of constipation.    Hypertension  Chronicity:  Chronic  Onset:  More than 1 year ago  Progression since onset:  Stable  Condition status:  Controlled  Associated symptoms: no blurred vision, no chest pain, no headaches, no palpitations, no shortness of breath, no dyspnea with exertion and no dizziness    CAD risks:  Diabetes mellitus and post-menopausal state  Current therapy:  ACE inhibitors and calcium channel blockers  Additional Information:  She says her blood pressure at home usually runs from 135-140/70s-80s. Today it is 135/79.   Diabetes  Diabetes type:  Type 2  Associated symptoms:     no blurred vision, no chest pain, no fatigue, no polydipsia and no polyuria    Hypoglycemia symptoms:     no dizziness, no headaches and is not nervous/anxious    Additional information:  She checks her blood sugars every morning (fasting), usually run between . If she runs higher, it usually is because of what she ate the day before. She is currently taking Mounjaro every week. Last A1c was 6.6  Hyperlipidemia  This is a chronic problem. The current episode started more than 1 year ago. Pertinent negatives include no chest pain or shortness of breath. Current antihyperlipidemic treatment includes statins. The current treatment provides significant improvement of lipids. There are no compliance problems.  Risk factors for coronary artery disease include diabetes mellitus, dyslipidemia, hypertension and post-menopausal.       PHQ-2 Depression Screening  Little interest or pleasure in doing things?     Feeling down, depressed, or hopeless?     PHQ-2 Total Score      PHQ-9 Depression Screening  Little interest or pleasure in doing things?     Feeling down, depressed, or hopeless?     Trouble falling or staying asleep, or sleeping too much?     Feeling tired or having little energy?     Poor appetite or overeating?     Feeling bad about yourself - or that you are a failure or have let yourself or your family down?     Trouble concentrating on things, such as reading the newspaper or watching television?     Moving or speaking so slowly that other people could have noticed? Or the opposite - being so fidgety or restless that you have been moving around a lot more than usual?     Thoughts that you would be better off dead, or of hurting yourself in some way?     PHQ-9 Total Score     If you checked off any problems, how difficult have these problems made it for you to do your work, take care of things at home, or get along with other people?       No Known Allergies    Prior to Admission medications    Medication Sig Start Date End Date Taking? Authorizing Provider   amLODIPine (NORVASC) 10 MG tablet Take 1 tablet by mouth Daily. 2/25/25  Yes Jerry Coto, DO   glucose blood (Glucose Meter Test) test strip check blood sugar every AM fasting and record. (Dx: E11.9) 7/23/24  Yes Jerry Coto DO   Lancets (freestyle) lancets Check blood sugar every morning fasting and record. 7/23/24  Yes Jerry Coto, DO   lisinopril (PRINIVIL,ZESTRIL) 20 MG tablet Take 1 tablet by mouth 2 (Two) Times a Day. 2/25/25  Yes Jerry Coto DO   rosuvastatin (CRESTOR) 40 MG tablet Take 1 tablet by mouth Daily. 2/25/25  Yes Jerry Coto DO   Synthroid 112 MCG tablet Take 1 tablet by mouth Daily. 2/25/25  Yes Jerry Coto, DO   Tirzepatide 5 MG/0.5ML solution auto-injector Inject 5 mg under the skin into the appropriate area as directed 1 (One) Time Per Week. 2/25/25  Yes Jerry Coto, DO   Tirzepatide 2.5 MG/0.5ML solution  auto-injector Inject 2.5 mg under the skin into the appropriate area as directed 1 (One) Time Per Week.  Patient not taking: Reported on 5/23/2025 2/25/25   Jerry Coto DO        Patient Active Problem List   Diagnosis    Pap smear for cervical cancer screening    Acquired hypothyroidism    Diabetes mellitus, type 2    Essential hypertension    Post-menopause    Vitamin D deficiency    Generalized anxiety disorder    Chronic pain of left knee    History of colonic polyps    Hyperlipidemia    Chronic right shoulder pain    Adenopathy        Past Surgical History:   Procedure Laterality Date    COLONOSCOPY      COLONOSCOPY N/A 3/30/2023    Procedure: COLONOSCOPY WITH POLYPECTOMY/BIOPSY;  Surgeon: Edd Marcelo MD;  Location: [facility] ENDOSCOPY;  Service: General;  Laterality: N/A;  COLON POLYP    ENDOMETRIAL ABLATION W/ NOVASURE      LEEP         Social History     Socioeconomic History    Marital status:    Tobacco Use    Smoking status: Never     Passive exposure: Never    Smokeless tobacco: Never   Vaping Use    Vaping status: Never Used   Substance and Sexual Activity    Alcohol use: Never    Drug use: Never    Sexual activity: Defer       Family History   Problem Relation Age of Onset    No Known Problems Mother     No Known Problems Father     No Known Problems Sister     No Known Problems Brother     No Known Problems Son     No Known Problems Daughter     No Known Problems Maternal Grandmother     No Known Problems Maternal Grandfather     No Known Problems Paternal Grandmother     No Known Problems Paternal Grandfather     No Known Problems Cousin     No Known Problems Other     Rheum arthritis Neg Hx     Osteoarthritis Neg Hx     Asthma Neg Hx     Diabetes Neg Hx     Heart failure Neg Hx     Hyperlipidemia Neg Hx     Hypertension Neg Hx     Migraines Neg Hx     Rashes / Skin problems Neg Hx     Seizures Neg Hx     Stroke Neg Hx     Thyroid disease Neg Hx        Family history, surgical  "history, past medical history, Allergies and meds reviewed with patient today and updated in [facility] EMR.     ROS:  Review of Systems   Constitutional:  Negative for fatigue.   HENT:  Negative for congestion, postnasal drip and rhinorrhea.    Eyes:  Negative for blurred vision and visual disturbance.   Respiratory:  Negative for cough, chest tightness, shortness of breath and wheezing.    Cardiovascular:  Negative for chest pain and palpitations.   Gastrointestinal:  Positive for constipation. Negative for abdominal pain, blood in stool, diarrhea and indigestion.   Endocrine: Negative for polydipsia and polyuria.   Allergic/Immunologic: Negative for environmental allergies.   Neurological:  Negative for dizziness, light-headedness and headache.   Psychiatric/Behavioral:  Negative for depressed mood. The patient is not nervous/anxious.        OBJECTIVE:  Vitals:    05/23/25 1408   BP: 135/79   BP Location: Left arm   Patient Position: Sitting   Pulse: 87   Temp: 97.8 °F (36.6 °C)   SpO2: 99%   Weight: 71.2 kg (157 lb)   Height: 162.6 cm (64.02\")     No results found.   Body mass index is 26.93 kg/m².  No LMP recorded (lmp unknown). Patient is postmenopausal.    The ASCVD Risk score (Mary Ann DK, et al., 2019) failed to calculate for the following reasons:    The valid total cholesterol range is 130 to 320 mg/dL     Physical Exam  Vitals and nursing note reviewed.   Constitutional:       General: She is not in acute distress.     Appearance: Normal appearance. She is normal weight.   HENT:      Head: Normocephalic.      Right Ear: Tympanic membrane, ear canal and external ear normal.      Left Ear: Tympanic membrane, ear canal and external ear normal.      Nose: Nose normal.      Mouth/Throat:      Mouth: Mucous membranes are moist.      Pharynx: Oropharynx is clear.   Eyes:      General: No scleral icterus.     Conjunctiva/sclera: Conjunctivae normal.      Pupils: Pupils are equal, round, and reactive to light. "   Cardiovascular:      Rate and Rhythm: Normal rate and regular rhythm.      Pulses: Normal pulses.      Heart sounds: Normal heart sounds. No murmur heard.  Pulmonary:      Effort: Pulmonary effort is normal.      Breath sounds: Normal breath sounds. No wheezing, rhonchi or rales.   Musculoskeletal:      Cervical back: Neck supple. No rigidity or tenderness.   Lymphadenopathy:      Cervical: No cervical adenopathy.   Skin:     General: Skin is warm and dry.      Coloration: Skin is not jaundiced.      Findings: No rash.   Neurological:      General: No focal deficit present.      Mental Status: She is alert and oriented to person, place, and time.   Psychiatric:         Mood and Affect: Mood normal.         Thought Content: Thought content normal.         Judgment: Judgment normal.         Procedures    No visits with results within 30 Day(s) from this visit.   Latest known visit with results is:   Office Visit on 02/25/2025   Component Date Value Ref Range Status    Glucose 05/19/2025 102 (H)  65 - 99 mg/dL Final    BUN 05/19/2025 11  6 - 20 mg/dL Final    Creatinine 05/19/2025 0.82  0.57 - 1.00 mg/dL Final    Sodium 05/19/2025 137  136 - 145 mmol/L Final    Potassium 05/19/2025 4.5  3.5 - 5.2 mmol/L Final    Chloride 05/19/2025 102  98 - 107 mmol/L Final    CO2 05/19/2025 27.0  22.0 - 29.0 mmol/L Final    Calcium 05/19/2025 9.9  8.6 - 10.5 mg/dL Final    Total Protein 05/19/2025 7.9  6.0 - 8.5 g/dL Final    Albumin 05/19/2025 4.3  3.5 - 5.2 g/dL Final    ALT (SGPT) 05/19/2025 12  1 - 33 U/L Final    AST (SGOT) 05/19/2025 16  1 - 32 U/L Final    Alkaline Phosphatase 05/19/2025 95  39 - 117 U/L Final    Total Bilirubin 05/19/2025 0.6  0.0 - 1.2 mg/dL Final    Globulin 05/19/2025 3.6  gm/dL Final    A/G Ratio 05/19/2025 1.2  g/dL Final    BUN/Creatinine Ratio 05/19/2025 13.4  7.0 - 25.0 Final    Anion Gap 05/19/2025 8.0  5.0 - 15.0 mmol/L Final    eGFR 05/19/2025 83.0  >60.0 mL/min/1.73 Final    Total Cholesterol  05/19/2025 128  0 - 200 mg/dL Final    Triglycerides 05/19/2025 45  0 - 150 mg/dL Final    HDL Cholesterol 05/19/2025 55  40 - 60 mg/dL Final    LDL Cholesterol  05/19/2025 62  0 - 100 mg/dL Final    VLDL Cholesterol 05/19/2025 11  5 - 40 mg/dL Final    LDL/HDL Ratio 05/19/2025 1.16   Final    TSH 05/19/2025 8.340 (H)  0.270 - 4.200 uIU/mL Final    25 Hydroxy, Vitamin D 05/19/2025 35.2  30.0 - 100.0 ng/ml Final    Free T4 05/19/2025 1.50  0.92 - 1.68 ng/dL Final       ASSESSMENT/ PLAN:    Diagnoses and all orders for this visit:    1. Pure hypercholesterolemia (Primary)  Assessment & Plan:   Her most recent lipid profile was good we will continue her atorvastatin daily.      2. Essential hypertension  Assessment & Plan:  Her blood pressure is the upper limits of normal.  She states she did drink a lot of coffee before coming in.  Will hold off on adjusting her meds any further.  She will continue to work on caffeine moderation exercise and weight loss      3. Type 2 diabetes mellitus without complication, without long-term current use of insulin  Assessment & Plan:  We did not get an A1c with her recent labs.  Will update that and titrate up her tirzepatide    Orders:  -     Hemoglobin A1c    4. Acquired hypothyroidism  Assessment & Plan:  With a profile showed an elevated TSH but with a normal T4.  I will repeat her A1c will get additional labs to see if she has Hashimoto's or Graves' thyroiditis.    Orders:  -     Thyroid Peroxidase Antibody; Future  -     Anti-Thyroglobulin Antibody; Future    Other orders  -     Tirzepatide 7.5 MG/0.5ML solution auto-injector; Inject 7.5 mg under the skin into the appropriate area as directed 1 (One) Time Per Week.  Dispense: 2 mL; Refill: 5        BMI is >= 25 and <30. (Overweight) The following options were offered after discussion;: exercise counseling/recommendations and nutrition counseling/recommendations      Orders Placed Today:     New Medications Ordered This Visit    Medications    Tirzepatide 7.5 MG/0.5ML solution auto-injector     Sig: Inject 7.5 mg under the skin into the appropriate area as directed 1 (One) Time Per Week.     Dispense:  2 mL     Refill:  5        Management Plan:     An After Visit Summary was printed and given to the patient at discharge.    Follow-up: Return in about 6 months (around 11/23/2025).    Jerry Coto,  5/23/2025 14:38 EDT  This note was electronically signed.

## 2025-05-23 NOTE — ASSESSMENT & PLAN NOTE
Her blood pressure is the upper limits of normal.  She states she did drink a lot of coffee before coming in.  Will hold off on adjusting her meds any further.  She will continue to work on caffeine moderation exercise and weight loss

## 2025-05-23 NOTE — ASSESSMENT & PLAN NOTE
With a profile showed an elevated TSH but with a normal T4.  I will repeat her A1c will get additional labs to see if she has Hashimoto's or Graves' thyroiditis.

## (undated) DEVICE — SOL IRR NACL 0.9PCT BT 1000ML

## (undated) DEVICE — Device: Brand: DEFENDO AIR/WATER/SUCTION AND BIOPSY VALVE

## (undated) DEVICE — SOLIDIFIER LIQLOC PLS 1500CC BT

## (undated) DEVICE — Device

## (undated) DEVICE — CONN JET HYDRA H20 AUXILIARY DISP

## (undated) DEVICE — SOL IRRG H2O PL/BG 1000ML STRL

## (undated) DEVICE — LINER SURG CANSTR SXN S/RIGD 1500CC

## (undated) DEVICE — SINGLE-USE BIOPSY FORCEPS: Brand: RADIAL JAW 4